# Patient Record
Sex: FEMALE | Race: OTHER | HISPANIC OR LATINO | URBAN - METROPOLITAN AREA
[De-identification: names, ages, dates, MRNs, and addresses within clinical notes are randomized per-mention and may not be internally consistent; named-entity substitution may affect disease eponyms.]

---

## 2021-09-05 ENCOUNTER — EMERGENCY (EMERGENCY)
Facility: HOSPITAL | Age: 21
LOS: 1 days | Discharge: HOME | End: 2021-09-05
Attending: EMERGENCY MEDICINE
Payer: MEDICAID

## 2021-09-05 VITALS
RESPIRATION RATE: 20 BRPM | OXYGEN SATURATION: 97 % | HEART RATE: 106 BPM | HEIGHT: 64 IN | SYSTOLIC BLOOD PRESSURE: 113 MMHG | TEMPERATURE: 98 F | WEIGHT: 149.91 LBS | DIASTOLIC BLOOD PRESSURE: 80 MMHG

## 2021-09-05 PROCEDURE — 99283 EMERGENCY DEPT VISIT LOW MDM: CPT

## 2021-09-05 RX ORDER — LORATADINE 10 MG/1
1 TABLET ORAL
Qty: 7 | Refills: 0
Start: 2021-09-05 | End: 2021-09-11

## 2021-09-05 RX ORDER — ALBUTEROL 90 UG/1
2 AEROSOL, METERED ORAL
Qty: 1 | Refills: 0
Start: 2021-09-05

## 2021-09-05 NOTE — ED PROVIDER NOTE - NSFOLLOWUPINSTRUCTIONS_ED_ALL_ED_FT
Please follow up with your primary care doctor in 1-2 days.  Please use the loratadine as needed for itchy throat.  Please use the albuterol for your cough.  Please return to the emergency department if you have shortness of breath, feeling like your throat is closing, vomiting, fever, or any other symptoms.      Cough, Adult    A cough helps to clear your throat and lungs. A cough may be a sign of an illness or another medical condition.    An acute cough may only last 2–3 weeks, while a chronic cough may last 8 or more weeks.  Many things can cause a cough. They include:  •Germs (viruses or bacteria) that attack the airway.  •Breathing in things that bother (irritate) your lungs.  •Allergies.  •Asthma.  •Mucus that runs down the back of your throat (postnasal drip).  •Smoking.  •Acid backing up from the stomach into the tube that moves food from the mouth to the stomach (gastroesophageal reflux).  •Some medicines.  •Lung problems.  •Other medical conditions, such as heart failure or a blood clot in the lung (pulmonary embolism).    Follow these instructions at home:    Medicines   •Take over-the-counter and prescription medicines only as told by your doctor.  •Talk with your doctor before you take medicines that stop a cough (cough suppressants).    Lifestyle   • Do not smoke, and try not to be around smoke. Do not use any products that contain nicotine or tobacco, such as cigarettes, e-cigarettes, and chewing tobacco. If you need help quitting, ask your doctor.  •Drink enough fluid to keep your pee (urine) pale yellow.  •Avoid caffeine.  • Do not drink alcohol if your doctor tells you not to drink.    General instructions    •Watch for any changes in your cough. Tell your doctor about them.  •Always cover your mouth when you cough.  •Stay away from things that make you cough, such as perfume, candles, campfire smoke, or cleaning products.  •If the air is dry, use a cool mist vaporizer or humidifier in your home.  •If your cough is worse at night, try using extra pillows to raise your head up higher while you sleep.  •Rest as needed.  •Keep all follow-up visits as told by your doctor. This is important.    Contact a doctor if:  •You have new symptoms.  •You cough up pus.  •Your cough does not get better after 2–3 weeks, or your cough gets worse.  •Cough medicine does not help your cough and you are not sleeping well.  •You have pain that gets worse or pain that is not helped with medicine.  •You have a fever.  •You are losing weight and you do not know why.  •You have night sweats.    Get help right away if:  •You cough up blood.  •You have trouble breathing.  •Your heartbeat is very fast.    These symptoms may be an emergency. Do not wait to see if the symptoms will go away. Get medical help right away. Call your local emergency services (911 in the [place] Do not drive yourself to the hospital.     Summary  •A cough helps to clear your throat and lungs. Many things can cause a cough.  •Take over-the-counter and prescription medicines only as told by your doctor.  •Always cover your mouth when you cough.  •Contact a doctor if you have new symptoms or you have a cough that does not get better or gets worse.    This information is not intended to replace advice given to you by your health care provider. Make sure you discuss any questions you have with your health care provider.

## 2021-09-05 NOTE — ED PROVIDER NOTE - PHYSICAL EXAMINATION
Afebrile, hemodynamically stable, saturating well  NAD, well appearing, sitting comfortably in bed, no WOB, speaking full sentences  No stridor, hoarseness, or drooling  Head NCAT  EOMI grossly, anicteric  MMM, uvula midline, no oropharyngeal lesions or exudates  No JVD  RRR, nml S1/S2, no m/r/g  Lungs CTAB, no w/r/r  Abd soft, NT, ND, nml BS, no rebound or guarding  AAO, CN's 3-12 grossly intact  MARADIAGA spontaneously, no leg cyanosis or edema  Skin warm, well perfused, no rashes or hives

## 2021-09-05 NOTE — ED PROVIDER NOTE - CLINICAL SUMMARY MEDICAL DECISION MAKING FREE TEXT BOX
No e/o PNA or fluid overload on exam. No e/o RAD/asthma exacerbation. No e/o PTA or RPA. Character low suspicion for strep. Itchy throat and asthma hx, as well as recently been told she has allergies, does not take meds for this, c/w allergic etiology. No airway swelling. Patient is well appearing, NAD, afebrile, hemodynamically stable, alert, well hydrated. Discharged with rx albuterol, loratadine, instructions in further symptomatic care, return precautions, and need for PMD f/u.

## 2021-09-05 NOTE — ED PROVIDER NOTE - OBJECTIVE STATEMENT
21yoF with h/o asthma, presents with muco-productive cough x1 wk and itchy throat x 2 days, is neal bothered by the throat. Denies fever, CP, SOB, and all other symptoms. Neg COVID swab during these symptoms. Had asthma as a kid, has not needed tx for this and does not have meds for this at home.

## 2021-09-05 NOTE — ED PROVIDER NOTE - PATIENT PORTAL LINK FT
You can access the FollowMyHealth Patient Portal offered by [facility] by registering at the following website: [email or web address]. By joining [employer]’s FollowMyHealth portal, you will also be able to view your health information using other applications (apps) compatible with our system.

## 2023-09-20 ENCOUNTER — EMERGENCY (EMERGENCY)
Facility: HOSPITAL | Age: 23
LOS: 1 days | Discharge: HOME | End: 2023-09-20
Attending: EMERGENCY MEDICINE
Payer: MEDICAID

## 2023-09-20 VITALS
TEMPERATURE: 98 F | SYSTOLIC BLOOD PRESSURE: 110 MMHG | OXYGEN SATURATION: 99 % | HEART RATE: 78 BPM | RESPIRATION RATE: 18 BRPM | DIASTOLIC BLOOD PRESSURE: 76 MMHG

## 2023-09-20 VITALS
RESPIRATION RATE: 18 BRPM | HEIGHT: 64 IN | TEMPERATURE: 98 F | HEART RATE: 80 BPM | WEIGHT: 148.81 LBS | DIASTOLIC BLOOD PRESSURE: 73 MMHG | SYSTOLIC BLOOD PRESSURE: 109 MMHG | OXYGEN SATURATION: 99 %

## 2023-09-20 LAB
ALBUMIN SERPL ELPH-MCNC: 3.6 G/DL — SIGNIFICANT CHANGE UP (ref 3.5–5)
ALP SERPL-CCNC: 68 U/L — SIGNIFICANT CHANGE UP (ref 40–120)
ALT FLD-CCNC: 20 U/L DA — SIGNIFICANT CHANGE UP (ref 10–60)
ANION GAP SERPL CALC-SCNC: 8 MMOL/L — SIGNIFICANT CHANGE UP (ref 5–17)
APPEARANCE UR: CLEAR — SIGNIFICANT CHANGE UP
AST SERPL-CCNC: 11 U/L — SIGNIFICANT CHANGE UP (ref 10–40)
BACTERIA # UR AUTO: ABNORMAL /HPF
BASOPHILS # BLD AUTO: 0.01 K/UL — SIGNIFICANT CHANGE UP (ref 0–0.2)
BASOPHILS NFR BLD AUTO: 0.1 % — SIGNIFICANT CHANGE UP (ref 0–2)
BILIRUB SERPL-MCNC: 0.3 MG/DL — SIGNIFICANT CHANGE UP (ref 0.2–1.2)
BILIRUB UR-MCNC: NEGATIVE — SIGNIFICANT CHANGE UP
BUN SERPL-MCNC: 13 MG/DL — SIGNIFICANT CHANGE UP (ref 7–18)
CALCIUM SERPL-MCNC: 8.8 MG/DL — SIGNIFICANT CHANGE UP (ref 8.4–10.5)
CHLORIDE SERPL-SCNC: 104 MMOL/L — SIGNIFICANT CHANGE UP (ref 96–108)
CO2 SERPL-SCNC: 29 MMOL/L — SIGNIFICANT CHANGE UP (ref 22–31)
COLOR SPEC: YELLOW — SIGNIFICANT CHANGE UP
CREAT SERPL-MCNC: 0.72 MG/DL — SIGNIFICANT CHANGE UP (ref 0.5–1.3)
DIFF PNL FLD: NEGATIVE — SIGNIFICANT CHANGE UP
EGFR: 121 ML/MIN/1.73M2 — SIGNIFICANT CHANGE UP
EOSINOPHIL # BLD AUTO: 0 K/UL — SIGNIFICANT CHANGE UP (ref 0–0.5)
EOSINOPHIL NFR BLD AUTO: 0 % — SIGNIFICANT CHANGE UP (ref 0–6)
EPI CELLS # UR: PRESENT
GLUCOSE SERPL-MCNC: 93 MG/DL — SIGNIFICANT CHANGE UP (ref 70–99)
GLUCOSE UR QL: NEGATIVE MG/DL — SIGNIFICANT CHANGE UP
HCG SERPL-ACNC: <1 MIU/ML — SIGNIFICANT CHANGE UP
HCT VFR BLD CALC: 36.1 % — SIGNIFICANT CHANGE UP (ref 34.5–45)
HGB BLD-MCNC: 11.6 G/DL — SIGNIFICANT CHANGE UP (ref 11.5–15.5)
IMM GRANULOCYTES NFR BLD AUTO: 0.1 % — SIGNIFICANT CHANGE UP (ref 0–0.9)
KETONES UR-MCNC: NEGATIVE MG/DL — SIGNIFICANT CHANGE UP
LEUKOCYTE ESTERASE UR-ACNC: ABNORMAL
LYMPHOCYTES # BLD AUTO: 2.22 K/UL — SIGNIFICANT CHANGE UP (ref 1–3.3)
LYMPHOCYTES # BLD AUTO: 32.8 % — SIGNIFICANT CHANGE UP (ref 13–44)
MCHC RBC-ENTMCNC: 29.7 PG — SIGNIFICANT CHANGE UP (ref 27–34)
MCHC RBC-ENTMCNC: 32.1 GM/DL — SIGNIFICANT CHANGE UP (ref 32–36)
MCV RBC AUTO: 92.6 FL — SIGNIFICANT CHANGE UP (ref 80–100)
MONOCYTES # BLD AUTO: 0.76 K/UL — SIGNIFICANT CHANGE UP (ref 0–0.9)
MONOCYTES NFR BLD AUTO: 11.2 % — SIGNIFICANT CHANGE UP (ref 2–14)
NEUTROPHILS # BLD AUTO: 3.76 K/UL — SIGNIFICANT CHANGE UP (ref 1.8–7.4)
NEUTROPHILS NFR BLD AUTO: 55.8 % — SIGNIFICANT CHANGE UP (ref 43–77)
NITRITE UR-MCNC: NEGATIVE — SIGNIFICANT CHANGE UP
NRBC # BLD: 0 /100 WBCS — SIGNIFICANT CHANGE UP (ref 0–0)
PH UR: 7 — SIGNIFICANT CHANGE UP (ref 5–8)
PLATELET # BLD AUTO: 275 K/UL — SIGNIFICANT CHANGE UP (ref 150–400)
POTASSIUM SERPL-MCNC: 4.2 MMOL/L — SIGNIFICANT CHANGE UP (ref 3.5–5.3)
POTASSIUM SERPL-SCNC: 4.2 MMOL/L — SIGNIFICANT CHANGE UP (ref 3.5–5.3)
PROT SERPL-MCNC: 7.6 G/DL — SIGNIFICANT CHANGE UP (ref 6–8.3)
PROT UR-MCNC: NEGATIVE MG/DL — SIGNIFICANT CHANGE UP
RBC # BLD: 3.9 M/UL — SIGNIFICANT CHANGE UP (ref 3.8–5.2)
RBC # FLD: 15 % — HIGH (ref 10.3–14.5)
RBC CASTS # UR COMP ASSIST: 2 /HPF — SIGNIFICANT CHANGE UP (ref 0–4)
SODIUM SERPL-SCNC: 141 MMOL/L — SIGNIFICANT CHANGE UP (ref 135–145)
SP GR SPEC: 1.01 — SIGNIFICANT CHANGE UP (ref 1–1.03)
UROBILINOGEN FLD QL: 0.2 MG/DL — SIGNIFICANT CHANGE UP (ref 0.2–1)
WBC # BLD: 6.76 K/UL — SIGNIFICANT CHANGE UP (ref 3.8–10.5)
WBC # FLD AUTO: 6.76 K/UL — SIGNIFICANT CHANGE UP (ref 3.8–10.5)
WBC UR QL: 2 /HPF — SIGNIFICANT CHANGE UP (ref 0–5)

## 2023-09-20 PROCEDURE — 84702 CHORIONIC GONADOTROPIN TEST: CPT

## 2023-09-20 PROCEDURE — 36415 COLL VENOUS BLD VENIPUNCTURE: CPT

## 2023-09-20 PROCEDURE — 99284 EMERGENCY DEPT VISIT MOD MDM: CPT | Mod: 25

## 2023-09-20 PROCEDURE — 99284 EMERGENCY DEPT VISIT MOD MDM: CPT

## 2023-09-20 PROCEDURE — 80053 COMPREHEN METABOLIC PANEL: CPT

## 2023-09-20 PROCEDURE — 76830 TRANSVAGINAL US NON-OB: CPT

## 2023-09-20 PROCEDURE — 76856 US EXAM PELVIC COMPLETE: CPT | Mod: 26

## 2023-09-20 PROCEDURE — 81001 URINALYSIS AUTO W/SCOPE: CPT

## 2023-09-20 PROCEDURE — 76830 TRANSVAGINAL US NON-OB: CPT | Mod: 26

## 2023-09-20 PROCEDURE — 76856 US EXAM PELVIC COMPLETE: CPT

## 2023-09-20 PROCEDURE — 85025 COMPLETE CBC W/AUTO DIFF WBC: CPT

## 2023-09-20 RX ORDER — KETOROLAC TROMETHAMINE 30 MG/ML
15 SYRINGE (ML) INJECTION ONCE
Refills: 0 | Status: COMPLETED | OUTPATIENT
Start: 2023-09-20 | End: 2023-09-20

## 2023-09-20 NOTE — ED PROVIDER NOTE - CLINICAL SUMMARY MEDICAL DECISION MAKING FREE TEXT BOX
Patient presenting complaining of pelvic pain with missed menses - plan for labs, HCG level to evaluate for early pregnancy, TVUS, pain control and re-evaluate.

## 2023-09-20 NOTE — ED PROVIDER NOTE - PROGRESS NOTE DETAILS
Patient reporting significant relief of symptoms with toradol.  Labs unremarkable.  Pending US result.

## 2023-09-20 NOTE — ED PROVIDER NOTE - PHYSICAL EXAMINATION
Exam:  General: Patient well appearing, vital signs within normal limits  HEENT: airway patent with moist mucous membranes  Cardiac: RRR S1/S2 with strong peripheral pulses  Respiratory: lungs clear without respiratory distress  GI: abdomen soft, mild diffuse lower abdominal/pelvic tenderness  Neuro: no gross neurologic deficits  Skin: warm, well perfused  Psych: normal mood and affect

## 2023-09-20 NOTE — ED PROVIDER NOTE - NSFOLLOWUPINSTRUCTIONS_ED_ALL_ED_FT
Thank you for choosing [facility] for your healthcare.    You were seen in the Emergency Department for pelvic pain.  Here you had blood testing did not show any signs of a significant infection and no signs of a urinary infection on your urine testing.  You had a pelvic ultrasound that shows a cyst on your right ovary that could be the cause of your pain.  You are not pregnant based off of the blood testing at this time.  You received an IV equivalent of ibuprofen in the emergency room for the your pain is significantly improved.  If your pain reoccurs you can try taking 2-3 regular ibuprofen tablets at home every 6 hours as needed.  Please return to the emergency room for uncontrollable nausea or vomiting, high fevers, severe uncontrollable pain despite home medications or for any other concerning or emergent medical issues.

## 2023-09-20 NOTE — ED PROVIDER NOTE - PATIENT PORTAL LINK FT
You can access the FollowMyHealth Patient Portal offered by [facility] by registering at the following website: [email or web address]. By joining [employer]’s FollowMyHealth portal, you will also be able to view your health information using other applications (apps) compatible with our system.

## 2023-09-20 NOTE — ED PROVIDER NOTE - OBJECTIVE STATEMENT
23-year-old woman with a past medical history of prior cholecystectomy presenting with bilateral pelvic pain beginning earlier today.  Reports has missed her period for the last 5 days but took negative home pregnancy test.  Denying any nauseousness or vomiting.  Does endorse some mild diarrhea.  No reported sick contacts or fevers.

## 2023-09-20 NOTE — ED ADULT NURSE NOTE - OBJECTIVE STATEMENT
C/O LOWER ABDOMINAL PAIN X 5 DAYS. + DIARRHEA AND NAUSEA. PAIN SCALE 5/10 MEDICATION GIVEN AS ORDERED. LABS. DONE

## 2024-07-27 ENCOUNTER — EMERGENCY (EMERGENCY)
Facility: HOSPITAL | Age: 24
LOS: 1 days | Discharge: HOME | End: 2024-07-27
Attending: EMERGENCY MEDICINE
Payer: MEDICAID

## 2024-07-27 VITALS
RESPIRATION RATE: 18 BRPM | HEART RATE: 89 BPM | OXYGEN SATURATION: 99 % | TEMPERATURE: 98 F | DIASTOLIC BLOOD PRESSURE: 70 MMHG | SYSTOLIC BLOOD PRESSURE: 116 MMHG | WEIGHT: 140.43 LBS | HEIGHT: 64 IN

## 2024-07-27 VITALS
DIASTOLIC BLOOD PRESSURE: 67 MMHG | OXYGEN SATURATION: 99 % | SYSTOLIC BLOOD PRESSURE: 104 MMHG | HEART RATE: 70 BPM | RESPIRATION RATE: 18 BRPM | TEMPERATURE: 99 F

## 2024-07-27 LAB
ALBUMIN SERPL ELPH-MCNC: 3.3 G/DL — LOW (ref 3.5–5)
ALP SERPL-CCNC: 59 U/L — SIGNIFICANT CHANGE UP (ref 40–120)
ALT FLD-CCNC: 24 U/L DA — SIGNIFICANT CHANGE UP (ref 10–60)
ANION GAP SERPL CALC-SCNC: 5 MMOL/L — SIGNIFICANT CHANGE UP (ref 5–17)
AST SERPL-CCNC: 13 U/L — SIGNIFICANT CHANGE UP (ref 10–40)
BASOPHILS # BLD AUTO: 0.03 K/UL — SIGNIFICANT CHANGE UP (ref 0–0.2)
BASOPHILS NFR BLD AUTO: 0.3 % — SIGNIFICANT CHANGE UP (ref 0–2)
BILIRUB SERPL-MCNC: 0.3 MG/DL — SIGNIFICANT CHANGE UP (ref 0.2–1.2)
BLD GP AB SCN SERPL QL: SIGNIFICANT CHANGE UP
BUN SERPL-MCNC: 9 MG/DL — SIGNIFICANT CHANGE UP (ref 7–18)
CALCIUM SERPL-MCNC: 9 MG/DL — SIGNIFICANT CHANGE UP (ref 8.4–10.5)
CHLORIDE SERPL-SCNC: 107 MMOL/L — SIGNIFICANT CHANGE UP (ref 96–108)
CO2 SERPL-SCNC: 23 MMOL/L — SIGNIFICANT CHANGE UP (ref 22–31)
CREAT SERPL-MCNC: 0.64 MG/DL — SIGNIFICANT CHANGE UP (ref 0.5–1.3)
EGFR: 127 ML/MIN/1.73M2 — SIGNIFICANT CHANGE UP
EOSINOPHIL # BLD AUTO: 0.01 K/UL — SIGNIFICANT CHANGE UP (ref 0–0.5)
EOSINOPHIL NFR BLD AUTO: 0.1 % — SIGNIFICANT CHANGE UP (ref 0–6)
GLUCOSE SERPL-MCNC: 90 MG/DL — SIGNIFICANT CHANGE UP (ref 70–99)
HCG SERPL-ACNC: HIGH MIU/ML
HCT VFR BLD CALC: 33.3 % — LOW (ref 34.5–45)
HGB BLD-MCNC: 11.2 G/DL — LOW (ref 11.5–15.5)
IMM GRANULOCYTES NFR BLD AUTO: 0.3 % — SIGNIFICANT CHANGE UP (ref 0–0.9)
LYMPHOCYTES # BLD AUTO: 1.76 K/UL — SIGNIFICANT CHANGE UP (ref 1–3.3)
LYMPHOCYTES # BLD AUTO: 20.1 % — SIGNIFICANT CHANGE UP (ref 13–44)
MAGNESIUM SERPL-MCNC: 1.9 MG/DL — SIGNIFICANT CHANGE UP (ref 1.6–2.6)
MCHC RBC-ENTMCNC: 30.7 PG — SIGNIFICANT CHANGE UP (ref 27–34)
MCHC RBC-ENTMCNC: 33.6 GM/DL — SIGNIFICANT CHANGE UP (ref 32–36)
MCV RBC AUTO: 91.2 FL — SIGNIFICANT CHANGE UP (ref 80–100)
MONOCYTES # BLD AUTO: 0.8 K/UL — SIGNIFICANT CHANGE UP (ref 0–0.9)
MONOCYTES NFR BLD AUTO: 9.1 % — SIGNIFICANT CHANGE UP (ref 2–14)
NEUTROPHILS # BLD AUTO: 6.14 K/UL — SIGNIFICANT CHANGE UP (ref 1.8–7.4)
NEUTROPHILS NFR BLD AUTO: 70.1 % — SIGNIFICANT CHANGE UP (ref 43–77)
NRBC # BLD: 0 /100 WBCS — SIGNIFICANT CHANGE UP (ref 0–0)
PLATELET # BLD AUTO: 269 K/UL — SIGNIFICANT CHANGE UP (ref 150–400)
POTASSIUM SERPL-MCNC: 4.1 MMOL/L — SIGNIFICANT CHANGE UP (ref 3.5–5.3)
POTASSIUM SERPL-SCNC: 4.1 MMOL/L — SIGNIFICANT CHANGE UP (ref 3.5–5.3)
PROT SERPL-MCNC: 7.2 G/DL — SIGNIFICANT CHANGE UP (ref 6–8.3)
RBC # BLD: 3.65 M/UL — LOW (ref 3.8–5.2)
RBC # FLD: 13.2 % — SIGNIFICANT CHANGE UP (ref 10.3–14.5)
SODIUM SERPL-SCNC: 135 MMOL/L — SIGNIFICANT CHANGE UP (ref 135–145)
WBC # BLD: 8.77 K/UL — SIGNIFICANT CHANGE UP (ref 3.8–10.5)
WBC # FLD AUTO: 8.77 K/UL — SIGNIFICANT CHANGE UP (ref 3.8–10.5)

## 2024-07-27 PROCEDURE — 76801 OB US < 14 WKS SINGLE FETUS: CPT

## 2024-07-27 PROCEDURE — 85025 COMPLETE CBC W/AUTO DIFF WBC: CPT

## 2024-07-27 PROCEDURE — 99284 EMERGENCY DEPT VISIT MOD MDM: CPT

## 2024-07-27 PROCEDURE — 86901 BLOOD TYPING SEROLOGIC RH(D): CPT

## 2024-07-27 PROCEDURE — 76817 TRANSVAGINAL US OBSTETRIC: CPT

## 2024-07-27 PROCEDURE — 80053 COMPREHEN METABOLIC PANEL: CPT

## 2024-07-27 PROCEDURE — 84702 CHORIONIC GONADOTROPIN TEST: CPT

## 2024-07-27 PROCEDURE — 76817 TRANSVAGINAL US OBSTETRIC: CPT | Mod: 26

## 2024-07-27 PROCEDURE — 99284 EMERGENCY DEPT VISIT MOD MDM: CPT | Mod: 25

## 2024-07-27 PROCEDURE — 86850 RBC ANTIBODY SCREEN: CPT

## 2024-07-27 PROCEDURE — 83735 ASSAY OF MAGNESIUM: CPT

## 2024-07-27 PROCEDURE — 36415 COLL VENOUS BLD VENIPUNCTURE: CPT

## 2024-07-27 PROCEDURE — 76801 OB US < 14 WKS SINGLE FETUS: CPT | Mod: 26

## 2024-07-27 PROCEDURE — 86900 BLOOD TYPING SEROLOGIC ABO: CPT

## 2024-07-27 NOTE — ED PROVIDER NOTE - CLINICAL SUMMARY MEDICAL DECISION MAKING FREE TEXT BOX
24 years old  1 para 0 complaining of vaginal bleeding since last night, concern for threatened .  Will get labs, beta-hCG, sono, type and screen and reassess

## 2024-07-27 NOTE — ED ADULT TRIAGE NOTE - CHIEF COMPLAINT QUOTE
Patient states " I am 8 weeks pregnant and I am spotting since last night." Patient reports this is first pregnancy, LMP  5/23/ 24 and due date 03/07/25.

## 2024-07-27 NOTE — ED PROVIDER NOTE - NSFOLLOWUPINSTRUCTIONS_ED_ALL_ED_FT
Threatened Miscarriage  A threatened miscarriage occurs when a woman has vaginal bleeding during the first 20 weeks of pregnancy but the pregnancy has not ended. If vaginal bleeding occurs during this time, the health care provider will do tests to make sure the woman is still pregnant. The woman's condition may be considered a threatened miscarriage if the tests show:  That she is still pregnant.  That the embryo or unborn baby (fetus) inside the uterus is still growing.  A threatened miscarriage does not mean your pregnancy will end, but it does increase the risk of losing your pregnancy (miscarriage).    What are the causes?  The cause of this condition is usually not known.    What increases the risk?  The following factors may make a pregnant woman more likely to have a miscarriage:    Certain medical conditions    Conditions that affect the hormone balance in the body, such as thyroid disease or polycystic ovary syndrome.  Diabetes.  Autoimmune disorders.  Infections.  Bleeding disorders.  Obesity.  Lifestyle factors    Using products with tobacco or nicotine or being exposed to tobacco smoke.  Having alcohol.  Having large amounts of caffeine.  Recreational drug use.  Problems with reproductive organs or structures    Cervical insufficiency. This is when the the lowest part of the uterus (cervix) opens and thins before pregnancy is at term.  Having a condition called Asherman syndrome, which causes scarring in the uterus or causes the uterus to be abnormal in structure.  Fibrous growths, called fibroids, in the uterus.  Congenital abnormalities. These problems are present at birth.  Infection of the cervix or uterus.  Personal or medical history    Injury (trauma).  Having had a miscarriage before.  Being younger than age 18 or older than age 35.  Exposure to harmful substances in the environment. This may include radiation or heavy metals, such as lead.  Using certain medicines.  What are the signs or symptoms?  Symptoms of this condition include:  Vaginal bleeding or spotting, with or without cramps or pain.  Mild pain or cramps in your abdomen.  How is this diagnosed?    You may have tests to check whether you are still pregnant. These tests will be done if you have bleeding, with or without pain, in your abdomen before the 20th week of pregnancy. These tests include:  Ultrasound.  A physical exam.  Measurement of your baby's heart rate.  Lab tests, such as blood tests, urine tests, or swabs for infection.  You may be diagnosed with a threatened miscarriage if:  Ultrasound testing shows that you are still pregnant.  Your baby's heart rate is strong.  A physical exam shows that your cervix is closed.  Blood tests confirm that you are still pregnant.  How is this treated?  No treatments have been shown to prevent a threatened miscarriage from going on to a complete miscarriage. However, the right home care is important.    Follow these instructions at home:  Get plenty of rest.  Do not have sex, douche, or put anything in your vagina, such as tampons, until your health care provider says it is okay.  Do not smoke or use recreational drugs.  Do not drink alcohol.  Avoid caffeine.  Keep all follow-up prenatal visits. This is important.  Contact a health care provider if:  You have light vaginal bleeding or spotting while pregnant.  You have pain or cramping in your abdomen.  You have a fever.  Get help right away if:  Heavy bleeding soaks through 2 large sanitary pads an hour for more than 2 hours.  Blood clots come out of your vagina.  Tissue comes out of your vagina.  You leak fluid, or you have a gush of fluid from your vagina.  You have severe low back pain or cramps in your abdomen.  You have a fever, chills, and severe pain in the abdomen.  Summary  A threatened miscarriage occurs when a woman bleeds from the vagina during the first 20 weeks of pregnancy but the pregnancy has not ended.  The cause of a threatened miscarriage is usually not known.  Symptoms of this condition may include vaginal bleeding and mild pain or cramps in your abdomen.  No treatments have been shown to prevent a threatened miscarriage from going on to a complete miscarriage.  Keep all follow-up prenatal visits. This is important.  This information is not intended to replace advice given to you by your health care provider. Make sure you discuss any questions you have with your health care provider.      No douching, intercourse until further instruction   drink plenty of fluids   follow-up with your obstetrician/gynecologist

## 2024-07-27 NOTE — ED PROVIDER NOTE - PATIENT PORTAL LINK FT
You can access the FollowMyHealth Patient Portal offered by [facility] by registering at the following website: [email or web address]. By joining [employer]’s FollowMyHealth portal, you will also be able to view your health information using other applications (apps) compatible with our system.

## 2024-07-27 NOTE — ED PROVIDER NOTE - PROGRESS NOTE DETAILS
labs/sono result explained to patient   patient with mild anemia, IUP 6 weeks 6 days.  Patient with no further vaginal bleeding, will DC home, advised to follow-up with GYN

## 2024-07-27 NOTE — ED ADULT NURSE NOTE - OBJECTIVE STATEMENT
pt is here for vaginal bleeding.  pt stated that 8 weeks pregnant, last night heavy bleeding, today, it was spotting, denied dizziness or headahce,

## 2024-07-27 NOTE — ED ADULT NURSE NOTE - NSFALLUNIVINTERV_ED_ALL_ED
Bed/Stretcher in lowest position, wheels locked, appropriate side rails in place/Call bell, personal items and telephone in reach/Instruct patient to call for assistance before getting out of bed/chair/stretcher/Non-slip footwear applied when patient is off stretcher/[place] to call system/Physically safe environment - no spills, clutter or unnecessary equipment/Purposeful proactive rounding/Room/bathroom lighting operational, light cord in reach

## 2024-07-27 NOTE — ED PROVIDER NOTE - CPE EDP [facility] NORM
Quality 226: Preventive Care And Screening: Tobacco Use: Screening And Cessation Intervention: Patient screened for tobacco use and is an ex/non-smoker Detail Level: Generalized Quality 431: Preventive Care And Screening: Unhealthy Alcohol Use - Screening: Patient not identified as an unhealthy alcohol user when screened for unhealthy alcohol use using a systematic screening method Quality 130: Documentation Of Current Medications In The Medical Record: Current Medications Documented normal...

## 2024-07-27 NOTE — ED PROVIDER NOTE - OBJECTIVE STATEMENT
24-year-old female with no PMH, , LMP .  Patient complaining of vaginal bleeding since last night, changed to pantiliners.  She denies intercourse, abdominal pain, dysuria, fever. patient claims today with minimum pinkish discharge.  She vomited x 1 this morning

## 2025-02-12 ENCOUNTER — OUTPATIENT (OUTPATIENT)
Dept: OUTPATIENT SERVICES | Facility: HOSPITAL | Age: 25
LOS: 1 days | End: 2025-02-12
Payer: COMMERCIAL

## 2025-02-12 VITALS — TEMPERATURE: 98 F | OXYGEN SATURATION: 88 %

## 2025-02-12 DIAGNOSIS — O26.899 OTHER SPECIFIED PREGNANCY RELATED CONDITIONS, UNSPECIFIED TRIMESTER: ICD-10-CM

## 2025-02-12 LAB
ALBUMIN SERPL ELPH-MCNC: 3.3 G/DL — SIGNIFICANT CHANGE UP (ref 3.3–5)
ALP SERPL-CCNC: 231 U/L — HIGH (ref 40–120)
ALT FLD-CCNC: 36 U/L — SIGNIFICANT CHANGE UP (ref 10–45)
AMYLASE P1 CFR SERPL: 68 U/L — SIGNIFICANT CHANGE UP (ref 25–125)
ANION GAP SERPL CALC-SCNC: 12 MMOL/L — SIGNIFICANT CHANGE UP (ref 5–17)
APPEARANCE UR: CLEAR — SIGNIFICANT CHANGE UP
APTT BLD: 27.6 SEC — SIGNIFICANT CHANGE UP (ref 24.5–35.6)
AST SERPL-CCNC: 39 U/L — SIGNIFICANT CHANGE UP (ref 10–40)
BACTERIA # UR AUTO: ABNORMAL /HPF
BASOPHILS # BLD AUTO: 0.01 K/UL — SIGNIFICANT CHANGE UP (ref 0–0.2)
BASOPHILS NFR BLD AUTO: 0.2 % — SIGNIFICANT CHANGE UP (ref 0–2)
BILIRUB SERPL-MCNC: 0.5 MG/DL — SIGNIFICANT CHANGE UP (ref 0.2–1.2)
BILIRUB UR-MCNC: NEGATIVE — SIGNIFICANT CHANGE UP
BUN SERPL-MCNC: 11 MG/DL — SIGNIFICANT CHANGE UP (ref 7–23)
CALCIUM SERPL-MCNC: 9.5 MG/DL — SIGNIFICANT CHANGE UP (ref 8.4–10.5)
CAST: 0 /LPF — SIGNIFICANT CHANGE UP (ref 0–4)
CHLORIDE SERPL-SCNC: 105 MMOL/L — SIGNIFICANT CHANGE UP (ref 96–108)
CO2 SERPL-SCNC: 19 MMOL/L — LOW (ref 22–31)
COLOR SPEC: YELLOW — SIGNIFICANT CHANGE UP
CREAT ?TM UR-MCNC: 38 MG/DL — SIGNIFICANT CHANGE UP
CREAT SERPL-MCNC: 0.61 MG/DL — SIGNIFICANT CHANGE UP (ref 0.5–1.3)
DIFF PNL FLD: NEGATIVE — SIGNIFICANT CHANGE UP
EGFR: 129 ML/MIN/1.73M2 — SIGNIFICANT CHANGE UP
EOSINOPHIL # BLD AUTO: 0.03 K/UL — SIGNIFICANT CHANGE UP (ref 0–0.5)
EOSINOPHIL NFR BLD AUTO: 0.5 % — SIGNIFICANT CHANGE UP (ref 0–6)
FIBRINOGEN PPP-MCNC: 531 MG/DL — HIGH (ref 200–445)
GLUCOSE SERPL-MCNC: 89 MG/DL — SIGNIFICANT CHANGE UP (ref 70–99)
GLUCOSE UR QL: NEGATIVE MG/DL — SIGNIFICANT CHANGE UP
HCT VFR BLD CALC: 33.3 % — LOW (ref 34.5–45)
HGB BLD-MCNC: 11 G/DL — LOW (ref 11.5–15.5)
IMM GRANULOCYTES NFR BLD AUTO: 0.4 % — SIGNIFICANT CHANGE UP (ref 0–0.9)
INR BLD: 0.83 RATIO — LOW (ref 0.85–1.16)
KETONES UR-MCNC: NEGATIVE MG/DL — SIGNIFICANT CHANGE UP
LDH SERPL L TO P-CCNC: 188 U/L — SIGNIFICANT CHANGE UP (ref 50–242)
LEUKOCYTE ESTERASE UR-ACNC: ABNORMAL
LIDOCAIN IGE QN: 40 U/L — SIGNIFICANT CHANGE UP (ref 7–60)
LYMPHOCYTES # BLD AUTO: 1.39 K/UL — SIGNIFICANT CHANGE UP (ref 1–3.3)
LYMPHOCYTES # BLD AUTO: 24.6 % — SIGNIFICANT CHANGE UP (ref 13–44)
MCHC RBC-ENTMCNC: 31.2 PG — SIGNIFICANT CHANGE UP (ref 27–34)
MCHC RBC-ENTMCNC: 33 G/DL — SIGNIFICANT CHANGE UP (ref 32–36)
MCV RBC AUTO: 94.3 FL — SIGNIFICANT CHANGE UP (ref 80–100)
MONOCYTES # BLD AUTO: 0.33 K/UL — SIGNIFICANT CHANGE UP (ref 0–0.9)
MONOCYTES NFR BLD AUTO: 5.8 % — SIGNIFICANT CHANGE UP (ref 2–14)
NEUTROPHILS # BLD AUTO: 3.88 K/UL — SIGNIFICANT CHANGE UP (ref 1.8–7.4)
NEUTROPHILS NFR BLD AUTO: 68.5 % — SIGNIFICANT CHANGE UP (ref 43–77)
NITRITE UR-MCNC: NEGATIVE — SIGNIFICANT CHANGE UP
NRBC BLD AUTO-RTO: 0 /100 WBCS — SIGNIFICANT CHANGE UP (ref 0–0)
PH UR: 7 — SIGNIFICANT CHANGE UP (ref 5–8)
PLATELET # BLD AUTO: 188 K/UL — SIGNIFICANT CHANGE UP (ref 150–400)
POTASSIUM SERPL-MCNC: 3.9 MMOL/L — SIGNIFICANT CHANGE UP (ref 3.5–5.3)
POTASSIUM SERPL-SCNC: 3.9 MMOL/L — SIGNIFICANT CHANGE UP (ref 3.5–5.3)
PROT ?TM UR-MCNC: <7 MG/DL — SIGNIFICANT CHANGE UP (ref 0–12)
PROT SERPL-MCNC: 6.4 G/DL — SIGNIFICANT CHANGE UP (ref 6–8.3)
PROT UR-MCNC: NEGATIVE MG/DL — SIGNIFICANT CHANGE UP
PROT/CREAT UR-RTO: <0.2 RATIO — SIGNIFICANT CHANGE UP (ref 0–0.2)
PROTHROM AB SERPL-ACNC: 9.5 SEC — LOW (ref 9.9–13.4)
RBC # BLD: 3.53 M/UL — LOW (ref 3.8–5.2)
RBC # FLD: 13.4 % — SIGNIFICANT CHANGE UP (ref 10.3–14.5)
RBC CASTS # UR COMP ASSIST: 0 /HPF — SIGNIFICANT CHANGE UP (ref 0–4)
SODIUM SERPL-SCNC: 136 MMOL/L — SIGNIFICANT CHANGE UP (ref 135–145)
SP GR SPEC: 1.01 — SIGNIFICANT CHANGE UP (ref 1–1.03)
SQUAMOUS # UR AUTO: 13 /HPF — HIGH (ref 0–5)
URATE SERPL-MCNC: 5.5 MG/DL — SIGNIFICANT CHANGE UP (ref 2.5–7)
UROBILINOGEN FLD QL: 0.2 MG/DL — SIGNIFICANT CHANGE UP (ref 0.2–1)
WBC # BLD: 5.66 K/UL — SIGNIFICANT CHANGE UP (ref 3.8–10.5)
WBC # FLD AUTO: 5.66 K/UL — SIGNIFICANT CHANGE UP (ref 3.8–10.5)
WBC UR QL: 13 /HPF — HIGH (ref 0–5)

## 2025-02-12 PROCEDURE — 96360 HYDRATION IV INFUSION INIT: CPT

## 2025-02-12 PROCEDURE — 85610 PROTHROMBIN TIME: CPT

## 2025-02-12 PROCEDURE — 80053 COMPREHEN METABOLIC PANEL: CPT

## 2025-02-12 PROCEDURE — 82150 ASSAY OF AMYLASE: CPT

## 2025-02-12 PROCEDURE — 87186 SC STD MICRODIL/AGAR DIL: CPT

## 2025-02-12 PROCEDURE — G0463: CPT

## 2025-02-12 PROCEDURE — 83615 LACTATE (LD) (LDH) ENZYME: CPT

## 2025-02-12 PROCEDURE — 83690 ASSAY OF LIPASE: CPT

## 2025-02-12 PROCEDURE — 84156 ASSAY OF PROTEIN URINE: CPT

## 2025-02-12 PROCEDURE — 85730 THROMBOPLASTIN TIME PARTIAL: CPT

## 2025-02-12 PROCEDURE — 82239 BILE ACIDS TOTAL: CPT

## 2025-02-12 PROCEDURE — 96361 HYDRATE IV INFUSION ADD-ON: CPT

## 2025-02-12 PROCEDURE — 84550 ASSAY OF BLOOD/URIC ACID: CPT

## 2025-02-12 PROCEDURE — 85384 FIBRINOGEN ACTIVITY: CPT

## 2025-02-12 PROCEDURE — 85025 COMPLETE CBC W/AUTO DIFF WBC: CPT

## 2025-02-12 PROCEDURE — 82570 ASSAY OF URINE CREATININE: CPT

## 2025-02-12 PROCEDURE — 81001 URINALYSIS AUTO W/SCOPE: CPT

## 2025-02-12 PROCEDURE — 87086 URINE CULTURE/COLONY COUNT: CPT

## 2025-02-12 RX ORDER — IBUPROFEN 200 MG
1 CAPSULE ORAL ONCE
Refills: 0 | Status: COMPLETED | OUTPATIENT
Start: 2025-02-12 | End: 2025-02-12

## 2025-02-12 RX ORDER — SODIUM CHLORIDE 9 G/ML
500 INJECTION, SOLUTION INTRAVENOUS ONCE
Refills: 0 | Status: COMPLETED | OUTPATIENT
Start: 2025-02-12 | End: 2025-02-12

## 2025-02-12 RX ORDER — SODIUM CHLORIDE 9 G/ML
1000 INJECTION, SOLUTION INTRAVENOUS
Refills: 0 | Status: ACTIVE | OUTPATIENT
Start: 2025-02-12 | End: 2026-01-11

## 2025-02-12 RX ADMIN — Medication 1 TABLET(S): at 22:42

## 2025-02-12 RX ADMIN — SODIUM CHLORIDE 125 MILLILITER(S): 9 INJECTION, SOLUTION INTRAVENOUS at 22:10

## 2025-02-12 RX ADMIN — SODIUM CHLORIDE 1000 MILLILITER(S): 9 INJECTION, SOLUTION INTRAVENOUS at 21:41

## 2025-02-12 NOTE — OB PROVIDER TRIAGE NOTE - HISTORY OF PRESENT ILLNESS
yo GP @  p/f. –VB, -LOF, -Ctx, +FM. denies fever, chills, nausea, vomiting, diarrhea, headache, constipation, dizziness, syncope, chest pain, palpitations, shortness of breath, dysuria, urgency, frequency.  PNC: [facility]  GBS:   EFW: #  ObHx: denies  GynHx: denies  MedHx: denies  PSHx: denies  PsychHx: denies  SocialHx: denies  AllergyHx: denies  RxHx: denies  25yo  @36w6d  p/f itchy palms x 4 days. Patient reports that over the past 4 days, she has had itchy palms and soles that is worse at night. She also reports dark urine of 1 day. Patient last saw OBGYN on 2/10/25. Patient's mother also had similar sx during her first pregnancy. –VB, -LOF, -Ctx, +FM. Patient reports nausea, well controlled w Zofran daily. Denies fever, chills, vomiting, diarrhea, headache, constipation, dizziness, syncope, chest pain, palpitations, shortness of breath, dysuria, urgency, frequency.  PNC: [facility]  GBS: unk  EFW: #  ObHx: denies  GynHx: denies  MedHx:  childhood asthma, reflux (on famotidine)  PSHx: cholecystectomy 2019  PsychHx: depression, previously on medication until pregnancy, but still seeing therapist  SocialHx: denies  AllergyHx: denies  RxHx: denies  25yo  @36w6d  p/f itchy palms x 4 days. Patient reports that over the past 4 days, she has had itchy palms and soles that is worse at night. She also reports dark urine of 1 day. Patient last saw OBGYN on 2/10/25. Patient's mother also had similar sx during her first pregnancy. –VB, -LOF, -Ctx, +FM. Patient reports nausea, well controlled w Zofran daily. Patient reports urinary frequency and mild suprapubic tenderness. Denies fever, chills, vomiting, diarrhea, headache, constipation, dizziness, syncope, chest pain, palpitations, shortness of breath,.  PNC: [facility]  GBS: unk  EFW: #  ObHx: denies  GynHx: denies  MedHx:  childhood asthma, reflux (on famotidine)  PSHx: cholecystectomy 2019  PsychHx: depression, previously on medication until pregnancy, but still seeing therapist  SocialHx: denies  AllergyHx: denies  RxHx: denies  25yo  @36w6d s/p cholecystectomy 2019 p/f itchy palms x 4 days. Patient reports that over the past 4 days, she has had itchy palms and soles that is worse at night. She also reports dark urine of 1 day. Patient last saw OBGYN on 2/10/25. Patient's mother also had similar sx during her first pregnancy. –VB, -LOF, -Ctx, +FM. Patient reports nausea, well controlled w Zofran daily. Patient reports urinary frequency and mild suprapubic tenderness. She endorses minimal lightheadedness/dizziness. Denies fever, chills, vomiting, diarrhea, headache, constipation, dizziness, syncope, chest pain, palpitations, shortness of breath,.  PNC: [facility]  GBS: unk  EFW:   ObHx: denies  GynHx: denies  MedHx:  childhood asthma, reflux (on famotidine)  PSHx: cholecystectomy 2019  PsychHx: depression, previously on medication until pregnancy, but still seeing therapist  SocialHx: denies  AllergyHx: denies  RxHx: denies  25yo  @36w6d s/p cholecystectomy 2019 p/f itchy palms x 4 days. Patient reports that over the past 4 days, she has had itchy palms and soles that is worse at night. She also reports dark urine of 1 day. Patient last saw OBGYN on 2/10/25. Patient's mother also had similar sx during her first pregnancy. –VB, -LOF, -Ctx, +FM. Patient reports nausea, well controlled w Zofran daily. Patient reports urinary frequency and mild suprapubic tenderness. She endorses minimal lightheadedness/dizziness. Denies fever, chills, vomiting, diarrhea, headache, constipation, dizziness, syncope, chest pain, palpitations, shortness of breath,.  PNC: [facility]  GBS: unk  EFW: 3038g extrapolated from sono 3w ago  ObHx: denies  GynHx: denies  MedHx:  childhood asthma, reflux (on famotidine)  PSHx: cholecystectomy 2019  PsychHx: depression, previously on medication until pregnancy, but still seeing therapist  SocialHx: denies  AllergyHx: denies  RxHx: denies

## 2025-02-12 NOTE — OB PROVIDER TRIAGE NOTE - NSOBPROVIDERNOTE_OBGYN_ALL_OB_FT
yo GP @ p/w  -admit to L&D  - for  - GBS  - EFW  - Routine Labs  - FHT/TOCO  - Anesthesia Consult  - Anticipate  23yo  @36w6d p/w itchy palms and soles x 4 days with 1 day of dark urine concerning for ICP  - observe in triage, f/u labs  - for  - GBS unk  - EFW  - Routine Labs  - FHT/TOCO  - Anesthesia Consult  - Anticipate  25yo  @36w6d p/w itchy palms and soles x 4 days with 1 day of dark urine concerning for ICP vs HELLP vs idiopathic pruritis   - observe in triage, f/u labs (HELLP with bile acids, coags, amylase and lipase, UA/UCx)  - for  - GBS unk  - EFW  - Routine Labs  - FHT/TOCO  - Anesthesia Consult  - Anticipate  23yo  @36w6d p/w itchy palms and soles x 4 days with 1 day of dark urine concerning for ICP vs HELLP vs idiopathic pruritis   - observe in triage, f/u labs (HELLP with bile acids, coags, amylase and lipase, UA/UCx)  - GBS unk  - EFW  - 500cc LR bolus w/maintenance 125cc/hr, Tums for reflux symptoms  - FH/TOCO    Dw Dr. Drake Angel PGY1     ADDENDUM  Pt endorsing improvement in lightheadedness/dizziness w/continued abdl cramping w/out consistent contractions. HELLP labs, amylase/lipase wnl. UA significant for bacteria and elevated LE c/w UTI.   Sent Macrobid 100 mg BID for UTI. Plan to f/u bile acids and UCx.     Michel Angel PGY1 25yo  @36w6d p/w itchy palms and soles x 4 days with 1 day of dark urine concerning for ICP vs HELLP vs idiopathic pruritis   - observe in triage, f/u labs (HELLP with bile acids, coags, amylase and lipase, UA/UCx)  - GBS unk  - EFW 3038g  - 500cc LR bolus w/maintenance 125cc/hr, Tums for reflux symptoms  - FH/TOCO    Dw Dr. Drake Angel PGY1     ADDENDUM  Pt endorsing improvement in lightheadedness/dizziness w/continued abdl cramping w/out consistent contractions. HELLP labs, amylase/lipase wnl. UA significant for bacteria and elevated LE c/w UTI.   Sent Macrobid 100 mg BID for UTI. Plan to f/u bile acids and UCx.     Michel Angel PGY1 23yo  @36w6d p/w itchy palms and soles x 4 days with 1 day of dark urine concerning for ICP vs HELLP vs idiopathic pruritis   - observe in triage, f/u labs (HELLP with bile acids, coags, amylase and lipase, UA/UCx)  - GBS unk  - EFW 3038g  - 500cc LR bolus w/maintenance 125cc/hr, Tums for reflux symptoms  - FH/TOCO    Dw Dr. Drake Angel PGY1     ADDENDUM  Pt endorsing improvement in lightheadedness/dizziness w/continued abdl cramping w/out consistent contractions. HELLP labs, amylase/lipase wnl. UA significant for bacteria and elevated LE c/w UTI.   Sent Macrobid 100 mg BID for UTI. Plan to f/u bile acids and UCx.     Dw Dr. Drake Angel PGY1    ATTG note    Read and agree with above PGY1 note    23 yo P0 @ 36.6 wks with c/o itchy palms and soles x 4 days.  ROS-dark urine  VSS, normotensive  Exam as above  DDX- ICP, other hepatic d/o, HELLP/PEC, UTI  NST-reactive  toco-irreg  LAbs- chem 20 nl  u/a- large LE, mnay WBC,   Bile acids pending  IVFH bolus  - No indication or dx or indication for delivery  - Clinically stable to dc to home to f/u this week in [facility]  - on f/u list of Bile acids    TERA Juan

## 2025-02-12 NOTE — OB PROVIDER TRIAGE NOTE - NSHPPHYSICALEXAM_GEN_ALL_CORE
Gen: NAD  CV: clinically well perfused  Pulm: unlabored respirations  Abd: soft, NT, ND, gravid, no rebound or guarding  SVE: //  FHT: , mod smita, + accels, - decels  TOCO: qm  Sono: cephalic Gen: NAD, itching palms and forearms  CV: clinically well perfused  Pulm: unlabored respirations  Abd: Mild suprapubic tenderness. Remaining abdomen is soft, NT, ND, gravid, no rebound or guarding. No CVA tenderness  SVE: //  FHT: , mod smita, + accels, - decels  TOCO: qm  Sono: cephalic Gen: NAD, itching palms and forearms  CV: clinically well perfused  Pulm: unlabored respirations  Abd: Mild suprapubic tenderness. Remaining abdomen is soft, NT, ND, gravid, no rebound or guarding. No CVA tenderness  SVE: deferred  FHT: baselien 120, mod smita, + accels, - decels  TOCO: irregular  Sono: cephalic

## 2025-02-13 VITALS
RESPIRATION RATE: 16 BRPM | SYSTOLIC BLOOD PRESSURE: 113 MMHG | DIASTOLIC BLOOD PRESSURE: 65 MMHG | HEART RATE: 58 BPM | TEMPERATURE: 98 F

## 2025-02-14 DIAGNOSIS — K21.9 GASTRO-ESOPHAGEAL REFLUX DISEASE WITHOUT ESOPHAGITIS: ICD-10-CM

## 2025-02-14 DIAGNOSIS — O99.513 DISEASES OF THE RESPIRATORY SYSTEM COMPLICATING PREGNANCY, THIRD TRIMESTER: ICD-10-CM

## 2025-02-14 DIAGNOSIS — L29.9 PRURITUS, UNSPECIFIED: ICD-10-CM

## 2025-02-14 DIAGNOSIS — Z3A.36 36 WEEKS GESTATION OF PREGNANCY: ICD-10-CM

## 2025-02-14 DIAGNOSIS — R11.0 NAUSEA: ICD-10-CM

## 2025-02-14 DIAGNOSIS — O99.713 DISEASES OF THE SKIN AND SUBCUTANEOUS TISSUE COMPLICATING PREGNANCY, THIRD TRIMESTER: ICD-10-CM

## 2025-02-14 DIAGNOSIS — O99.613 DISEASES OF THE DIGESTIVE SYSTEM COMPLICATING PREGNANCY, THIRD TRIMESTER: ICD-10-CM

## 2025-02-14 DIAGNOSIS — O99.343 OTHER MENTAL DISORDERS COMPLICATING PREGNANCY, THIRD TRIMESTER: ICD-10-CM

## 2025-02-14 DIAGNOSIS — J45.909 UNSPECIFIED ASTHMA, UNCOMPLICATED: ICD-10-CM

## 2025-02-14 DIAGNOSIS — O23.43 UNSPECIFIED INFECTION OF URINARY TRACT IN PREGNANCY, THIRD TRIMESTER: ICD-10-CM

## 2025-02-14 DIAGNOSIS — O26.893 OTHER SPECIFIED PREGNANCY RELATED CONDITIONS, THIRD TRIMESTER: ICD-10-CM

## 2025-02-14 DIAGNOSIS — R42 DIZZINESS AND GIDDINESS: ICD-10-CM

## 2025-02-14 DIAGNOSIS — F32.A DEPRESSION, UNSPECIFIED: ICD-10-CM

## 2025-02-15 LAB
-  AMOXICILLIN/CLAVULANIC ACID: SIGNIFICANT CHANGE UP
-  AMPICILLIN/SULBACTAM: SIGNIFICANT CHANGE UP
-  AMPICILLIN: SIGNIFICANT CHANGE UP
-  AZTREONAM: SIGNIFICANT CHANGE UP
-  CEFAZOLIN: SIGNIFICANT CHANGE UP
-  CEFEPIME: SIGNIFICANT CHANGE UP
-  CEFOXITIN: SIGNIFICANT CHANGE UP
-  CEFTRIAXONE: SIGNIFICANT CHANGE UP
-  CEFUROXIME: SIGNIFICANT CHANGE UP
-  CIPROFLOXACIN: SIGNIFICANT CHANGE UP
-  ERTAPENEM: SIGNIFICANT CHANGE UP
-  GENTAMICIN: SIGNIFICANT CHANGE UP
-  IMIPENEM: SIGNIFICANT CHANGE UP
-  LEVOFLOXACIN: SIGNIFICANT CHANGE UP
-  MEROPENEM: SIGNIFICANT CHANGE UP
-  NITROFURANTOIN: SIGNIFICANT CHANGE UP
-  PIPERACILLIN/TAZOBACTAM: SIGNIFICANT CHANGE UP
-  TOBRAMYCIN: SIGNIFICANT CHANGE UP
-  TRIMETHOPRIM/SULFAMETHOXAZOLE: SIGNIFICANT CHANGE UP
BILE AC FLD-MCNC: 82 UMOL/L — HIGH (ref 0–10)
CULTURE RESULTS: ABNORMAL
METHOD TYPE: SIGNIFICANT CHANGE UP
ORGANISM # SPEC MICROSCOPIC CNT: ABNORMAL
ORGANISM # SPEC MICROSCOPIC CNT: ABNORMAL
SPECIMEN SOURCE: SIGNIFICANT CHANGE UP

## 2025-02-18 ENCOUNTER — INPATIENT (INPATIENT)
Facility: HOSPITAL | Age: 25
LOS: 2 days | Discharge: HOME | End: 2025-02-21
Attending: OBSTETRICS & GYNECOLOGY | Admitting: OBSTETRICS & GYNECOLOGY
Payer: COMMERCIAL

## 2025-02-18 VITALS
DIASTOLIC BLOOD PRESSURE: 61 MMHG | SYSTOLIC BLOOD PRESSURE: 118 MMHG | HEART RATE: 87 BPM | TEMPERATURE: 98 F | OXYGEN SATURATION: 98 % | RESPIRATION RATE: 18 BRPM

## 2025-02-18 DIAGNOSIS — Z34.80 ENCOUNTER FOR SUPERVISION OF OTHER NORMAL PREGNANCY, UNSPECIFIED TRIMESTER: ICD-10-CM

## 2025-02-18 DIAGNOSIS — O26.899 OTHER SPECIFIED PREGNANCY RELATED CONDITIONS, UNSPECIFIED TRIMESTER: ICD-10-CM

## 2025-02-18 LAB
ADD ON TEST-SPECIMEN IN LAB: SIGNIFICANT CHANGE UP
ALBUMIN SERPL ELPH-MCNC: 3.4 G/DL — SIGNIFICANT CHANGE UP (ref 3.3–5)
ALP SERPL-CCNC: 322 U/L — HIGH (ref 40–120)
ALT FLD-CCNC: 199 U/L — HIGH (ref 10–45)
ANION GAP SERPL CALC-SCNC: 15 MMOL/L — SIGNIFICANT CHANGE UP (ref 5–17)
AST SERPL-CCNC: 154 U/L — HIGH (ref 10–40)
BASOPHILS # BLD AUTO: 0.01 K/UL — SIGNIFICANT CHANGE UP (ref 0–0.2)
BASOPHILS NFR BLD AUTO: 0.2 % — SIGNIFICANT CHANGE UP (ref 0–2)
BILIRUB SERPL-MCNC: 0.6 MG/DL — SIGNIFICANT CHANGE UP (ref 0.2–1.2)
BLD GP AB SCN SERPL QL: NEGATIVE — SIGNIFICANT CHANGE UP
BUN SERPL-MCNC: 9 MG/DL — SIGNIFICANT CHANGE UP (ref 7–23)
CALCIUM SERPL-MCNC: 9.4 MG/DL — SIGNIFICANT CHANGE UP (ref 8.4–10.5)
CHLORIDE SERPL-SCNC: 103 MMOL/L — SIGNIFICANT CHANGE UP (ref 96–108)
CO2 SERPL-SCNC: 21 MMOL/L — LOW (ref 22–31)
CREAT SERPL-MCNC: 0.86 MG/DL — SIGNIFICANT CHANGE UP (ref 0.5–1.3)
EGFR: 97 ML/MIN/1.73M2 — SIGNIFICANT CHANGE UP
EOSINOPHIL # BLD AUTO: 0.02 K/UL — SIGNIFICANT CHANGE UP (ref 0–0.5)
EOSINOPHIL NFR BLD AUTO: 0.3 % — SIGNIFICANT CHANGE UP (ref 0–6)
GLUCOSE SERPL-MCNC: 82 MG/DL — SIGNIFICANT CHANGE UP (ref 70–99)
HBV SURFACE AG SERPL QL IA: SIGNIFICANT CHANGE UP
HCT VFR BLD CALC: 35.1 % — SIGNIFICANT CHANGE UP (ref 34.5–45)
HCV AB S/CO SERPL IA: 0.05 S/CO — SIGNIFICANT CHANGE UP
HCV AB SERPL-IMP: SIGNIFICANT CHANGE UP
HGB BLD-MCNC: 11.6 G/DL — SIGNIFICANT CHANGE UP (ref 11.5–15.5)
IMM GRANULOCYTES NFR BLD AUTO: 0.3 % — SIGNIFICANT CHANGE UP (ref 0–0.9)
LYMPHOCYTES # BLD AUTO: 1.11 K/UL — SIGNIFICANT CHANGE UP (ref 1–3.3)
LYMPHOCYTES # BLD AUTO: 19.2 % — SIGNIFICANT CHANGE UP (ref 13–44)
MCHC RBC-ENTMCNC: 31.9 PG — SIGNIFICANT CHANGE UP (ref 27–34)
MCHC RBC-ENTMCNC: 33 G/DL — SIGNIFICANT CHANGE UP (ref 32–36)
MCV RBC AUTO: 96.4 FL — SIGNIFICANT CHANGE UP (ref 80–100)
MONOCYTES # BLD AUTO: 0.59 K/UL — SIGNIFICANT CHANGE UP (ref 0–0.9)
MONOCYTES NFR BLD AUTO: 10.2 % — SIGNIFICANT CHANGE UP (ref 2–14)
NEUTROPHILS # BLD AUTO: 4.04 K/UL — SIGNIFICANT CHANGE UP (ref 1.8–7.4)
NEUTROPHILS NFR BLD AUTO: 69.8 % — SIGNIFICANT CHANGE UP (ref 43–77)
NRBC BLD AUTO-RTO: 0 /100 WBCS — SIGNIFICANT CHANGE UP (ref 0–0)
PLATELET # BLD AUTO: 220 K/UL — SIGNIFICANT CHANGE UP (ref 150–400)
POTASSIUM SERPL-MCNC: 4.1 MMOL/L — SIGNIFICANT CHANGE UP (ref 3.5–5.3)
POTASSIUM SERPL-SCNC: 4.1 MMOL/L — SIGNIFICANT CHANGE UP (ref 3.5–5.3)
PROT SERPL-MCNC: 7.2 G/DL — SIGNIFICANT CHANGE UP (ref 6–8.3)
RBC # BLD: 3.64 M/UL — LOW (ref 3.8–5.2)
RBC # FLD: 13.5 % — SIGNIFICANT CHANGE UP (ref 10.3–14.5)
RH IG SCN BLD-IMP: POSITIVE — SIGNIFICANT CHANGE UP
RH IG SCN BLD-IMP: POSITIVE — SIGNIFICANT CHANGE UP
SODIUM SERPL-SCNC: 139 MMOL/L — SIGNIFICANT CHANGE UP (ref 135–145)
WBC # BLD: 5.79 K/UL — SIGNIFICANT CHANGE UP (ref 3.8–10.5)
WBC # FLD AUTO: 5.79 K/UL — SIGNIFICANT CHANGE UP (ref 3.8–10.5)

## 2025-02-18 RX ORDER — URSODIOL 300 MG/1
500 CAPSULE ORAL THREE TIMES A DAY
Refills: 0 | Status: DISCONTINUED | OUTPATIENT
Start: 2025-02-18 | End: 2025-02-19

## 2025-02-18 RX ORDER — SODIUM CHLORIDE 9 G/1000ML
1000 INJECTION, SOLUTION INTRAVENOUS
Refills: 0 | Status: DISCONTINUED | OUTPATIENT
Start: 2025-02-18 | End: 2025-02-19

## 2025-02-18 RX ORDER — OXYTOCIN-SODIUM CHLORIDE 0.9% IV SOLN 30 UNIT/500ML 30-0.9/5 UT/ML-%
167 SOLUTION INTRAVENOUS
Qty: 30 | Refills: 0 | Status: DISCONTINUED | OUTPATIENT
Start: 2025-02-18 | End: 2025-02-21

## 2025-02-18 RX ORDER — INFLUENZA A VIRUS A/IDAHO/07/2018 (H1N1) ANTIGEN (MDCK CELL DERIVED, PROPIOLACTONE INACTIVATED, INFLUENZA A VIRUS A/INDIANA/08/2018 (H3N2) ANTIGEN (MDCK CELL DERIVED, PROPIOLACTONE INACTIVATED), INFLUENZA B VIRUS B/SINGAPORE/INFTT-16-0610/2016 ANTIGEN (MDCK CELL DERIVED, PROPIOLACTONE INACTIVATED), INFLUENZA B VIRUS B/IOWA/06/2017 ANTIGEN (MDCK CELL DERIVED, PROPIOLACTONE INACTIVATED) 15; 15; 15; 15 UG/.5ML; UG/.5ML; UG/.5ML; UG/.5ML
0.5 INJECTION, SUSPENSION INTRAMUSCULAR ONCE
Refills: 0 | Status: DISCONTINUED | OUTPATIENT
Start: 2025-02-18 | End: 2025-02-21

## 2025-02-18 RX ORDER — CITRIC ACID/SODIUM CITRATE 300-500 MG
15 SOLUTION, ORAL ORAL EVERY 6 HOURS
Refills: 0 | Status: DISCONTINUED | OUTPATIENT
Start: 2025-02-18 | End: 2025-02-19

## 2025-02-18 RX ORDER — NITROFURANTOIN MACROCRYSTAL 100 MG
100 CAPSULE ORAL
Refills: 0 | Status: COMPLETED | OUTPATIENT
Start: 2025-02-18 | End: 2025-02-19

## 2025-02-18 RX ORDER — AMPICILLIN SODIUM 1 G/1
1 INJECTION, POWDER, FOR SOLUTION INTRAMUSCULAR; INTRAVENOUS EVERY 4 HOURS
Refills: 0 | Status: DISCONTINUED | OUTPATIENT
Start: 2025-02-18 | End: 2025-02-19

## 2025-02-18 RX ORDER — AMPICILLIN SODIUM 1 G/1
2 INJECTION, POWDER, FOR SOLUTION INTRAMUSCULAR; INTRAVENOUS ONCE
Refills: 0 | Status: COMPLETED | OUTPATIENT
Start: 2025-02-18 | End: 2025-02-18

## 2025-02-18 RX ADMIN — SODIUM CHLORIDE 125 MILLILITER(S): 9 INJECTION, SOLUTION INTRAVENOUS at 20:09

## 2025-02-18 RX ADMIN — SODIUM CHLORIDE 125 MILLILITER(S): 9 INJECTION, SOLUTION INTRAVENOUS at 21:38

## 2025-02-18 RX ADMIN — URSODIOL 500 MILLIGRAM(S): 300 CAPSULE ORAL at 23:24

## 2025-02-18 RX ADMIN — AMPICILLIN SODIUM 200 GRAM(S): 1 INJECTION, POWDER, FOR SOLUTION INTRAMUSCULAR; INTRAVENOUS at 21:38

## 2025-02-18 NOTE — OB PROVIDER H&P - HISTORY OF PRESENT ILLNESS
23yo  @37w5d presents for IOL for ICP (BA: 82.5). Patient initially presented to triage @36w6d with pruritus; BA sent and resulted today as 82.0 () consistent with ICP and patient was added on for induction today. –VB, -LOF, -Ctx, +FM.   GBS unk   PNC: [facility]    GBS: unk  EFW: 3038g extrapolated from sono 3w ago  ObHx: denies  GynHx: denies  MedHx:  childhood asthma, reflux (on famotidine)  PSHx: cholecystectomy 2019  PsychHx: depression, previously on medication until pregnancy, but still seeing therapist  SocialHx: denies  AllergyHx: denies  RxHx: denies  23yo  @37w5d presents for IOL for ICP (BA: 82.5). Patient initially presented to triage @36w6d with pruritus; BA sent and resulted today as 82.0 () consistent with ICP and patient was added on for induction today. –VB, -LOF, -Ctx, +FM.   GBS pos  PNC: [facility]    GBS: unk  EFW: 3038g extrapolated from sono 3w ago  ObHx: denies  GynHx: denies  MedHx:  childhood asthma, reflux (on famotidine)  PSHx: cholecystectomy   PsychHx: depression, previously on medication until pregnancy, but still seeing therapist  SocialHx: denies  AllergyHx: denies  RxHx: denies  25yo  @37w5d presents for IOL for ICP (BA: 82.5). Patient initially presented to triage @36w6d with pruritus; BA sent and resulted today as 82.0 () consistent with ICP and patient was added on for induction today. –VB, -LOF, -Ctx, +FM.   GBS pos  [facility]: [facility]    GBS: pos  EFW: 3038g extrapolated from sono 3w ago  ObHx: denies  GynHx: denies  MedHx:  childhood asthma, reflux (on famotidine)  PSHx: cholecystectomy   PsychHx: depression, previously on medication until pregnancy, but still seeing therapist  SocialHx: denies  AllergyHx: denies  RxHx: denies

## 2025-02-18 NOTE — OB RN PATIENT PROFILE - FALL HARM RISK - UNIVERSAL INTERVENTIONS
Bed in lowest position, wheels locked, appropriate side rails in place/Call bell, personal items and telephone in reach/Instruct patient to call for assistance before getting out of bed or chair/Non-slip footwear when patient is out of bed/[place] to call system/Physically safe environment - no spills, clutter or unnecessary equipment/Purposeful Proactive Rounding/Room/bathroom lighting operational, light cord in reach

## 2025-02-18 NOTE — OB PROVIDER IHI INDUCTION/AUGMENTATION NOTE - NS_IHIMETHOD_OBGYN_ALL_OB
Pitocin/Cytotec Vaginal/CRB, Cervical Ripening Balloon Cytotec Vaginal/CRB, Cervical Ripening Balloon

## 2025-02-18 NOTE — OB PROVIDER H&P - ATTENDING COMMENTS
/Attendiny/o  @37.5wks called in for IOL 2nd to ICP.  Pt was discussed with me; chart reviewed; pt seen at bedside and I agree with the above Resident's assessment and plan.    Pt was seen on L&D on  w/ c/o itching of palms, at which time Bile Acids were sent to r/o ICP; labs returned w/ Bile Acids of 82 and pt called to come for IOL.  Pt was also diagnosed with a UTI and was started on Macrobid (cultures sensitive) and is still taking.  Pt w/ PNC at [facility] and has been fairly uncomplicated, as per pt; took PO Iron for mild anemia.  Hx of childhood asthma, but no hospitalizations, or intubations and has not needed to use inhaler.    Prenatal labs reviewed on Pt's phone:  Blood Type: O+;  GBS: positive; HepB/C: neg; HIV: neg; RPR: neg    VSS, afebrile  Pt admits to still having itching of palms; otherwise denies any other complaints.  EFW: 3200gms (extrapolated from [facility].3wks ago)  FHT: BL: 140bpm; Cat-1  [place]: occasional irreg ctx's (not felt by pt)  SVE: /-3 @20:41  Memb: Intact    Labs:  H/H: 11.6/35.1; Plt: 220; Cr.: 0.8; AST/ALT: 154/199    A/P  -23y/o  @37.5wks called in for IOL 2nd to ICP (BA: 82) with transaminitis.  -Consent for management on L&D and for delivery obtained after R/B/A reviewed and all questions answered and pt voiced understanding.  -Pt with unfavorable cervix and will ripen the cervix w/ Vaginal Cytotec and Cervical balloon, which were placed @20:45.  -ICP w/ transaminitis; will continue to monitor LFT's.  -Anesthesia consult for pain management when desires  -SCD's for DVT prophylaxis while in bed.  -Low PPH risk and no need for PPH prophylaxis after delivery of placenta at this time; will continue to evaluate during intrapartum course.  -Anticipate vaginal delivery at this time    Dr. Keene /Attendiny/o  @37.5wks called in for IOL 2nd to ICP.  Pt was discussed with me; chart reviewed; pt seen at bedside and I agree with the above Resident's assessment and plan.    Pt was seen on L&D on  w/ c/o itching of palms, at which time Bile Acids were sent to r/o ICP; labs returned w/ Bile Acids of 82 and pt called to come for IOL.  Pt was also diagnosed with a UTI and was started on Macrobid (cultures sensitive) and is still taking.  Pt w/ PNC at [facility] and has been fairly uncomplicated, as per pt; took PO Iron for mild anemia.  Hx of childhood asthma, but no hospitalizations, or intubations and has not needed to use inhaler.    Prenatal labs reviewed on Pt's phone:  Blood Type: O+;  GBS: positive; HepB/C: neg; HIV: neg; RPR: neg    VSS, afebrile  Pt admits to still having itching of palms; otherwise denies any other complaints.  EFW: 3200gms (extrapolated from [place] [facility].3wks ago)  FHT: BL: 140bpm; Cat-1  [place]: occasional irreg ctx's (not felt by pt)  SVE: /-3 @20:41  Memb: Intact    Labs:  H/H: 11.6/35.1; Plt: 220; Cr.: 0.8; AST/ALT: 154/199    A/P  -23y/o  @37.5wks called in for IOL 2nd to ICP (BA: 82) with transaminitis.  -Consent for management on L&D and for delivery obtained after R/B/A reviewed and all questions answered and pt voiced understanding.  -Pt with unfavorable cervix and will ripen the cervix w/ Vaginal Cytotec and Cervical balloon, which were placed @20:45.  -GBS+ and to get Amp for GBS prophylaxis.  -ICP w/ transaminitis; will continue to monitor LFT's.  -Anesthesia consult for pain management when desires  -SCD's for DVT prophylaxis while in bed.  -Low PPH risk and no need for PPH prophylaxis after delivery of placenta at this time; will continue to evaluate during intrapartum course.  -Anticipate vaginal delivery at this time    Dr. Keene

## 2025-02-18 NOTE — OB RN PATIENT PROFILE - WEIGHT: TOTAL WEIGHT IN LBS
8/26/2022    Ms. Jah Ortiz  [address] [id number]      Dear Jah Diana:    Please find your most recent results below. Urine shows infection, cholesterol is trending up  Resulted Orders   MICROALBUMIN, UR, RAND W/ MICROALB/CREAT RATIO   Result Value Ref Range    Microalbumin,urine random <0.50 MG/DL    Creatinine, urine 36.80 mg/dL    Microalbumin/Creat ratio (mg/g creat) <14 0 - 30 mg/g   URINALYSIS W/ RFLX MICROSCOPIC   Result Value Ref Range    Color YELLOW/STRAW      Appearance CLEAR CLEAR      Specific gravity 1.005 1.003 - 1.030      pH (UA) 6.5 5.0 - 8.0      Protein Negative Negative mg/dL    Glucose Negative Negative mg/dL    Ketone Negative Negative mg/dL    Bilirubin Negative Negative      Blood Negative Negative      Urobilinogen 0.2 0.2 - 1.0 EU/dL    Nitrites Positive (A) Negative      Leukocyte Esterase SMALL (A) Negative     VITAMIN D, 25 HYDROXY   Result Value Ref Range    Vitamin D 25-Hydroxy 59.2 30 - 100 ng/mL   TSH 3RD GENERATION   Result Value Ref Range    TSH 0.83 0.36 - 3.74 uIU/mL   LIPID PANEL   Result Value Ref Range    Cholesterol, total 202 (H) <200 MG/DL    Triglyceride 86 <150 MG/DL    HDL Cholesterol 76 MG/DL    LDL, calculated 108.8 (H) 0 - 100 MG/DL    VLDL, calculated 17.2 MG/DL    CHOL/HDL Ratio 2.7 0.0 - 5.0     CBC WITH AUTOMATED DIFF   Result Value Ref Range    WBC 4.1 3.6 - 11.0 K/uL    RBC 5.07 3.80 - 5.20 M/uL    HGB 13.7 11.5 - 16.0 g/dL    HCT 43.1 35.0 - 47.0 %    MCV 85.0 80.0 - 99.0 FL    MCH 27.0 26.0 - 34.0 PG    MCHC 31.8 30.0 - 36.5 g/dL    RDW 15.8 (H) 11.5 - 14.5 %    PLATELET 183 079 - 128 K/uL    MPV 12.9 8.9 - 12.9 FL    NRBC 0.0 0  WBC    ABSOLUTE NRBC 0.00 0.00 - 0.01 K/uL    NEUTROPHILS 60 32 - 75 %    LYMPHOCYTES 32 12 - 49 %    MONOCYTES 6 5 - 13 %    EOSINOPHILS 1 0 - 7 %    BASOPHILS 1 0 - 1 %    IMMATURE GRANULOCYTES 0 0.0 - 0.5 %    ABS. NEUTROPHILS 2.5 1.8 - 8.0 K/UL    ABS. LYMPHOCYTES 1.3 0.8 - 3.5 K/UL    ABS. MONOCYTES 0.3 0.0 - 1.0 K/UL    ABS. EOSINOPHILS 0.1 0.0 - 0.4 K/UL    ABS. BASOPHILS 0.0 0.0 - 0.1 K/UL    ABS. IMM. GRANS. 0.0 0.00 - 0.04 K/UL    DF AUTOMATED     METABOLIC PANEL, COMPREHENSIVE   Result Value Ref Range    Sodium 136 136 - 145 mmol/L    Potassium 3.5 3.5 - 5.1 mmol/L    Chloride 100 97 - 108 mmol/L    CO2 26 21 - 32 mmol/L    Anion gap 10 5 - 15 mmol/L    Glucose 111 (H) 65 - 100 mg/dL    BUN 16 6 - 20 MG/DL    Creatinine 0.75 0.55 - 1.02 MG/DL    BUN/Creatinine ratio 21 (H) 12 - 20      GFR est AA >60 >60 ml/min/1.73m2    GFR est non-AA >60 >60 ml/min/1.73m2    Calcium 9.9 8.5 - 10.1 MG/DL    Bilirubin, total 0.7 0.2 - 1.0 MG/DL    ALT (SGPT) 26 12 - 78 U/L    AST (SGOT) 20 15 - 37 U/L    Alk. phosphatase 74 45 - 117 U/L    Protein, total 8.0 6.4 - 8.2 g/dL    Albumin 4.2 3.5 - 5.0 g/dL    Globulin 3.8 2.0 - 4.0 g/dL    A-G Ratio 1.1 1.1 - 2.2     URINE MICROSCOPIC ONLY   Result Value Ref Range    WBC 0-4 0 - 4 /hpf    RBC 0-5 0 - 5 /hpf    Epithelial cells FEW FEW /lpf    Bacteria 2+ (A) Negative /hpf       RECOMMENDATIONS:  Work on diet and exercise. Continue with current  diet and medications.  Antibiotic for uti is in the pharmacy  Please call me if you have any questions: [phone]    Sincerely,      Jackeline Godinez MD 30

## 2025-02-18 NOTE — OB PROVIDER H&P - ASSESSMENT
A&P: 23yo  @37w5d presents for IOL for ICP (BA: 82.5). Patient initially presented to triage @36w6d with pruritus; BA sent and resulted today as 82.0 () consistent with ICP and patient was added on for induction today.   Labor: admit to L&D  -vaginal cytotec and cervical balloon   -HELLP labs   -EFM/toco  -NPO, IV hydration  Fetal: cat 1 tracing, fetal status reassuring  GBS: unk  Analgesia: epi prn       Discussed with Dr. Sammi Sarabia PGY-2 A&P: 23yo  @37w5d presents for IOL for ICP (BA: 82.5). Patient initially presented to triage @36w6d with pruritus; BA sent and resulted today as 82.0 () consistent with ICP and patient was added on for induction today.   Labor: admit to L&D  -vaginal cytotec and cervical balloon   -HELLP labs   -EFM/toco  -NPO, IV hydration  Fetal: cat 1 tracing, fetal status reassuring  GBS: pos  Analgesia: epi prn       Discussed with Dr. Sammi Sarabia PGY-2

## 2025-02-18 NOTE — CHART NOTE - NSCHARTNOTEFT_GEN_A_CORE
Attempted to contact patient via provided telephone number [phone] to explain elevated bile acids and positive urine culture. Called x2, voicemail box not set up. Email sent to [facility] to establish care.     Del Angel PGY1
R3 OB Chart Note    In short, pt is a 24y  at 37w5d who presented at 36w6d with pruritus; BA sent and resulted as 82.0 () consistent with ICP.    Attempted to contact pt and pt's emergency contact via telephone multiple times this afternoon, however with no response noted. Pt emailed and requested to call L&D urgently.    Pt called L&D at 415pm and I spoke with the pt. Notified pt of dx of ICP and recommended pt present to L&D today for IOL. Discussed with pt risks of ICP including fetal distress and stillbirth. Pt reported she will present to L&D later today for IOL. Questions answered.    D/w DELFINA Petit attending and DELFINA Smith fellow  [facility] PGY3

## 2025-02-19 LAB
HIV 1 & 2 AB SERPL IA.RAPID: SIGNIFICANT CHANGE UP
HIV 1+2 AB+HIV1 P24 AG SERPL QL IA: SIGNIFICANT CHANGE UP
T PALLIDUM AB TITR SER: NEGATIVE — SIGNIFICANT CHANGE UP

## 2025-02-19 PROCEDURE — 59409 OBSTETRICAL CARE: CPT | Mod: U7,GC

## 2025-02-19 RX ORDER — OXYTOCIN-SODIUM CHLORIDE 0.9% IV SOLN 30 UNIT/500ML 30-0.9/5 UT/ML-%
2 SOLUTION INTRAVENOUS
Qty: 30 | Refills: 0 | Status: DISCONTINUED | OUTPATIENT
Start: 2025-02-19 | End: 2025-02-19

## 2025-02-19 RX ORDER — HYDROCORTISONE 10 MG/G
1 CREAM TOPICAL EVERY 6 HOURS
Refills: 0 | Status: DISCONTINUED | OUTPATIENT
Start: 2025-02-19 | End: 2025-02-21

## 2025-02-19 RX ORDER — IBUPROFEN 200 MG
600 TABLET ORAL EVERY 6 HOURS
Refills: 0 | Status: COMPLETED | OUTPATIENT
Start: 2025-02-19 | End: 2026-01-18

## 2025-02-19 RX ORDER — OXYCODONE HYDROCHLORIDE 30 MG/1
5 TABLET ORAL ONCE
Refills: 0 | Status: DISCONTINUED | OUTPATIENT
Start: 2025-02-19 | End: 2025-02-21

## 2025-02-19 RX ORDER — SODIUM CHLORIDE 9 G/1000ML
1000 INJECTION, SOLUTION INTRAVENOUS ONCE
Refills: 0 | Status: COMPLETED | OUTPATIENT
Start: 2025-02-19 | End: 2025-02-19

## 2025-02-19 RX ORDER — SIMETHICONE 80 MG
80 TABLET,CHEWABLE ORAL EVERY 4 HOURS
Refills: 0 | Status: DISCONTINUED | OUTPATIENT
Start: 2025-02-19 | End: 2025-02-21

## 2025-02-19 RX ORDER — PRAMOXINE HCL 1 %
1 GEL (GRAM) TOPICAL EVERY 4 HOURS
Refills: 0 | Status: DISCONTINUED | OUTPATIENT
Start: 2025-02-19 | End: 2025-02-21

## 2025-02-19 RX ORDER — DIPHENHYDRAMINE HCL 12.5MG/5ML
25 ELIXIR ORAL EVERY 6 HOURS
Refills: 0 | Status: DISCONTINUED | OUTPATIENT
Start: 2025-02-19 | End: 2025-02-21

## 2025-02-19 RX ORDER — BENZOCAINE 220 MG/G
1 SPRAY, METERED PERIODONTAL EVERY 6 HOURS
Refills: 0 | Status: DISCONTINUED | OUTPATIENT
Start: 2025-02-19 | End: 2025-02-21

## 2025-02-19 RX ORDER — OXYTOCIN-SODIUM CHLORIDE 0.9% IV SOLN 30 UNIT/500ML 30-0.9/5 UT/ML-%
333 SOLUTION INTRAVENOUS
Qty: 30 | Refills: 0 | Status: DISCONTINUED | OUTPATIENT
Start: 2025-02-19 | End: 2025-02-21

## 2025-02-19 RX ORDER — KETOROLAC TROMETHAMINE 30 MG/ML
30 INJECTION, SOLUTION INTRAMUSCULAR; INTRAVENOUS ONCE
Refills: 0 | Status: DISCONTINUED | OUTPATIENT
Start: 2025-02-19 | End: 2025-02-19

## 2025-02-19 RX ORDER — OXYTOCIN-SODIUM CHLORIDE 0.9% IV SOLN 30 UNIT/500ML 30-0.9/5 UT/ML-%
2 SOLUTION INTRAVENOUS
Qty: 30 | Refills: 0 | Status: DISCONTINUED | OUTPATIENT
Start: 2025-02-19 | End: 2025-02-21

## 2025-02-19 RX ORDER — DIBUCAINE 10 MG/G
1 OINTMENT TOPICAL EVERY 6 HOURS
Refills: 0 | Status: DISCONTINUED | OUTPATIENT
Start: 2025-02-19 | End: 2025-02-21

## 2025-02-19 RX ORDER — CLOSTRIDIUM TETANI TOXOID ANTIGEN (FORMALDEHYDE INACTIVATED), CORYNEBACTERIUM DIPHTHERIAE TOXOID ANTIGEN (FORMALDEHYDE INACTIVATED), BORDETELLA PERTUSSIS TOXOID ANTIGEN (GLUTARALDEHYDE INACTIVATED), BORDETELLA PERTUSSIS FILAMENTOUS HEMAGGLUTININ ANTIGEN (FORMALDEHYDE INACTIVATED), BORDETELLA PERTUSSIS PERTACTIN ANTIGEN, AND BORDETELLA PERTUSSIS FIMBRIAE 2/3 ANTIGEN 5; 2; 2.5; 5; 3; 5 [LF]/.5ML; [LF]/.5ML; UG/.5ML; UG/.5ML; UG/.5ML; UG/.5ML
0.5 INJECTION, SUSPENSION INTRAMUSCULAR ONCE
Refills: 0 | Status: DISCONTINUED | OUTPATIENT
Start: 2025-02-19 | End: 2025-02-21

## 2025-02-19 RX ORDER — MAGNESIUM HYDROXIDE 400 MG/5ML
30 SUSPENSION ORAL
Refills: 0 | Status: DISCONTINUED | OUTPATIENT
Start: 2025-02-19 | End: 2025-02-21

## 2025-02-19 RX ORDER — IBUPROFEN 200 MG
600 TABLET ORAL EVERY 6 HOURS
Refills: 0 | Status: DISCONTINUED | OUTPATIENT
Start: 2025-02-19 | End: 2025-02-21

## 2025-02-19 RX ORDER — PRENATAL 136/IRON/FOLIC ACID 27 MG-1 MG
1 TABLET ORAL DAILY
Refills: 0 | Status: DISCONTINUED | OUTPATIENT
Start: 2025-02-19 | End: 2025-02-21

## 2025-02-19 RX ORDER — MODIFIED LANOLIN 100 %
1 CREAM (GRAM) TOPICAL EVERY 6 HOURS
Refills: 0 | Status: DISCONTINUED | OUTPATIENT
Start: 2025-02-19 | End: 2025-02-21

## 2025-02-19 RX ORDER — WITCH HAZEL LEAF
1 FLUID EXTRACT MISCELLANEOUS EVERY 4 HOURS
Refills: 0 | Status: DISCONTINUED | OUTPATIENT
Start: 2025-02-19 | End: 2025-02-21

## 2025-02-19 RX ORDER — ACETAMINOPHEN 500 MG/5ML
975 LIQUID (ML) ORAL
Refills: 0 | Status: DISCONTINUED | OUTPATIENT
Start: 2025-02-19 | End: 2025-02-19

## 2025-02-19 RX ORDER — OXYCODONE HYDROCHLORIDE 30 MG/1
5 TABLET ORAL
Refills: 0 | Status: DISCONTINUED | OUTPATIENT
Start: 2025-02-19 | End: 2025-02-21

## 2025-02-19 RX ADMIN — Medication 600 MILLIGRAM(S): at 22:30

## 2025-02-19 RX ADMIN — Medication 100 MILLIGRAM(S): at 17:18

## 2025-02-19 RX ADMIN — SODIUM CHLORIDE 1000 MILLILITER(S): 9 INJECTION, SOLUTION INTRAVENOUS at 12:22

## 2025-02-19 RX ADMIN — Medication 600 MILLIGRAM(S): at 21:22

## 2025-02-19 RX ADMIN — URSODIOL 500 MILLIGRAM(S): 300 CAPSULE ORAL at 05:23

## 2025-02-19 RX ADMIN — OXYTOCIN-SODIUM CHLORIDE 0.9% IV SOLN 30 UNIT/500ML 2 MILLIUNIT(S)/MIN: 30-0.9/5 SOLUTION at 03:56

## 2025-02-19 RX ADMIN — Medication 100 MILLIGRAM(S): at 05:23

## 2025-02-19 RX ADMIN — AMPICILLIN SODIUM 100 GRAM(S): 1 INJECTION, POWDER, FOR SOLUTION INTRAMUSCULAR; INTRAVENOUS at 13:04

## 2025-02-19 RX ADMIN — AMPICILLIN SODIUM 100 GRAM(S): 1 INJECTION, POWDER, FOR SOLUTION INTRAMUSCULAR; INTRAVENOUS at 09:27

## 2025-02-19 RX ADMIN — KETOROLAC TROMETHAMINE 30 MILLIGRAM(S): 30 INJECTION, SOLUTION INTRAMUSCULAR; INTRAVENOUS at 15:35

## 2025-02-19 RX ADMIN — Medication 15 MILLILITER(S): at 07:07

## 2025-02-19 RX ADMIN — AMPICILLIN SODIUM 100 GRAM(S): 1 INJECTION, POWDER, FOR SOLUTION INTRAMUSCULAR; INTRAVENOUS at 01:51

## 2025-02-19 RX ADMIN — OXYTOCIN-SODIUM CHLORIDE 0.9% IV SOLN 30 UNIT/500ML 2 MILLIUNIT(S)/MIN: 30-0.9/5 SOLUTION at 06:05

## 2025-02-19 RX ADMIN — AMPICILLIN SODIUM 100 GRAM(S): 1 INJECTION, POWDER, FOR SOLUTION INTRAMUSCULAR; INTRAVENOUS at 05:25

## 2025-02-19 RX ADMIN — OXYTOCIN-SODIUM CHLORIDE 0.9% IV SOLN 30 UNIT/500ML 333 MILLIUNIT(S)/MIN: 30-0.9/5 SOLUTION at 14:13

## 2025-02-19 NOTE — OB RN DELIVERY SUMMARY - NSSELHIDDEN_OBGYN_ALL_OB_FT
[NS_DeliveryAttending1_OBGYN_ALL_OB_FT:[email or web address]=],[NS_DeliveryAssist1_OBGYN_ALL_OB_FT:[id number]=],[NS_DeliveryRN_OBGYN_ALL_OB_FT:[id number]=]

## 2025-02-19 NOTE — OB PROVIDER LABOR PROGRESS NOTE - ASSESSMENT
A/P: 23y/o  @37w6d IOL for ICP   - Labor: spVC/CB, Pit@4a  - Fetus: cat 1  - GBS: Amp  - Pain: Epidural, well controlled.     Patient progressing slowly. Pitocin at 2u/h with irregular ctx pattern. Will increase 2x2 to break pattern    Uzma Haider, PGY2  d/w Dr. Chapa

## 2025-02-19 NOTE — OB PROVIDER LABOR PROGRESS NOTE - NS_OBIHIFHRDETAILS_OBGYN_ALL_OB_FT
130/mod/+accels/-decels
baseline 125, mod variability, + accels, no decels
baseline 145  mod smita  +accels  -decels

## 2025-02-19 NOTE — OB PROVIDER LABOR PROGRESS NOTE - ASSESSMENT
Will continue with pitocin.    Mayda Burroughs, PGY1 Will continue with pitocin.    Mayda Burroughs, PGY1  Discussed with Dr. Cho

## 2025-02-19 NOTE — OB PROVIDER LABOR PROGRESS NOTE - ASSESSMENT
24y  @37w6d here for IOL for ICP  sp VC  CB in vault, removed  VE 3/50/-3  Low dose pitocin for further cervical ripening  Cont fetal/maternal monitoring  Will reassess PRN    dw Dr. Sammi Angel PGY1

## 2025-02-19 NOTE — OB PROVIDER DELIVERY SUMMARY - NSPROVIDERDELIVERYNOTE_OBGYN_ALL_OB_FT
Spontaneous vaginal delivery of liveborn infant from OA position. Head, shoulders, and body delivered easily. Infant passed to mother. Cord was clamped and cut after delayed cord clamping. Placenta delivered spontaneously, noted to be intact. Fundal massage was given and uterine fundus was found to be firm. Vaginal exam revealed intact cervix, sulci, vaginal walls, and perineum. Periurethral laceration noted and repaired in standard fashion with vicryl suture. Excellent hemostasis was noted. Patient stable. Count correct x 2.    Mayda Burroughs, PGY-1  Delivery with Dr. Cho and Dr. Medley, PGY4 Spontaneous vaginal delivery of liveborn infant from OA position. Head, shoulders, and body delivered easily. Infant passed to mother. Cord was clamped and cut after delayed cord clamping. Placenta delivered spontaneously, noted to be intact. Fundal massage was given and uterine fundus was found to be firm. Vaginal exam revealed intact cervix, sulci, vaginal walls, and perineum. Periurethral laceration noted and repaired in standard fashion with vicryl suture. Excellent hemostasis was noted. Patient stable. Count correct x 2.    Mayda Burroughs, PGY-1  Delivery with Dr. Cho and Dr. Medley, PGY4    Agree with above  Kathy CAMARGO

## 2025-02-19 NOTE — OB PROVIDER LABOR PROGRESS NOTE - NS_SUBJECTIVE/OBJECTIVE_OBGYN_ALL_OB_FT
Patient seen and examined at bedside.  Effective epidural in situ.    T(C): 36.7 (02-19-25 @ 01:59), Max: 36.9 (02-18-25 @ 20:01)  HR: 68 (02-19-25 @ 03:03) (57 - 97)  BP: 114/65 (02-19-25 @ 02:44) (114/65 - 129/68)  RR: 18 (02-19-25 @ 01:59) (18 - 18)  SpO2: 99% (02-19-25 @ 03:03) (96% - 100%)
comfortable with epidural
Pt reexamined for increasing pressure.

## 2025-02-19 NOTE — OB RN DELIVERY SUMMARY - NS_SEPSISRSKCALC_OBGYN_ALL_OB_FT
EOS calculated successfully. EOS Risk Factor: 0.5/1000 live births ([facility] national incidence); GA=37w6d; Temp=99.5; ROM=2.917; GBS='Unknown'; Antibiotics='GBS specific antibiotics > 2 hrs prior to birth'

## 2025-02-19 NOTE — OB PROVIDER DELIVERY SUMMARY - NSSELHIDDEN_OBGYN_ALL_OB_FT
[NS_DeliveryAttending1_OBGYN_ALL_OB_FT:[email or web address]=],[NS_DeliveryAssist1_OBGYN_ALL_OB_FT:[id number]=]

## 2025-02-19 NOTE — OB PROVIDER LABOR PROGRESS NOTE - ASSESSMENT
OB attg note    Pt seen and examined. 25yo  at 37+6 here for IOL for ICP with BA 82 and elev LFTs 154/199. Pt with hx of childhood asthma and anemia prev on PO supplementation. S/p vag cyto and CB, started on pit at 4am and AROMed on this exam. Continue pit titration. GBS pos on amp, after AROM noted to have intermittent variable decels but prev cat 1. Consider AI if recurrent variables. Anticipate . EFW 3200.     ICU Vital Signs Last 24 Hrs  T(C): 36.6 (2025 11:34), Max: 36.9 (2025 20:01)  T(F): 97.88 (2025 11:34), Max: 98.42 (2025 20:01)  HR: 61 (2025 12:09) (55 - 97)  BP: 114/70 (2025 11:52) (100/57 - 129/68)  BP(mean): --  ABP: --  ABP(mean): --  RR: 16 (2025 11:34) (16 - 18)  SpO2: 97% (2025 12:09) (96% - 100%)    O2 Parameters below as of 2025 20:56  Patient On (Oxygen Delivery Method): room air                              11.6   5.79  )-----------( 220      ( 2025 20:33 )             35.1     Kathy CAMARGO

## 2025-02-20 LAB
ALBUMIN SERPL ELPH-MCNC: 2.5 G/DL — LOW (ref 3.3–5)
ALP SERPL-CCNC: 219 U/L — HIGH (ref 40–120)
ALT FLD-CCNC: 145 U/L — HIGH (ref 10–45)
ANION GAP SERPL CALC-SCNC: 11 MMOL/L — SIGNIFICANT CHANGE UP (ref 5–17)
AST SERPL-CCNC: 108 U/L — HIGH (ref 10–40)
BILIRUB SERPL-MCNC: 0.3 MG/DL — SIGNIFICANT CHANGE UP (ref 0.2–1.2)
BUN SERPL-MCNC: 10 MG/DL — SIGNIFICANT CHANGE UP (ref 7–23)
CALCIUM SERPL-MCNC: 8.9 MG/DL — SIGNIFICANT CHANGE UP (ref 8.4–10.5)
CHLORIDE SERPL-SCNC: 105 MMOL/L — SIGNIFICANT CHANGE UP (ref 96–108)
CO2 SERPL-SCNC: 22 MMOL/L — SIGNIFICANT CHANGE UP (ref 22–31)
CREAT SERPL-MCNC: 0.72 MG/DL — SIGNIFICANT CHANGE UP (ref 0.5–1.3)
EGFR: 120 ML/MIN/1.73M2 — SIGNIFICANT CHANGE UP
GLUCOSE SERPL-MCNC: 78 MG/DL — SIGNIFICANT CHANGE UP (ref 70–99)
POTASSIUM SERPL-MCNC: 4 MMOL/L — SIGNIFICANT CHANGE UP (ref 3.5–5.3)
POTASSIUM SERPL-SCNC: 4 MMOL/L — SIGNIFICANT CHANGE UP (ref 3.5–5.3)
PROT SERPL-MCNC: 5.4 G/DL — LOW (ref 6–8.3)
SODIUM SERPL-SCNC: 138 MMOL/L — SIGNIFICANT CHANGE UP (ref 135–145)

## 2025-02-20 RX ORDER — DEXTROMETHORPHAN HBR, GUAIFENESIN 200 MG/10ML
100 LIQUID ORAL EVERY 6 HOURS
Refills: 0 | Status: DISCONTINUED | OUTPATIENT
Start: 2025-02-20 | End: 2025-02-21

## 2025-02-20 RX ADMIN — Medication 600 MILLIGRAM(S): at 13:10

## 2025-02-20 RX ADMIN — Medication 600 MILLIGRAM(S): at 05:13

## 2025-02-20 RX ADMIN — Medication 600 MILLIGRAM(S): at 18:24

## 2025-02-20 RX ADMIN — Medication 600 MILLIGRAM(S): at 06:15

## 2025-02-20 RX ADMIN — Medication 600 MILLIGRAM(S): at 23:06

## 2025-02-20 RX ADMIN — Medication 1 TABLET(S): at 12:39

## 2025-02-20 RX ADMIN — Medication 600 MILLIGRAM(S): at 17:54

## 2025-02-20 RX ADMIN — Medication 600 MILLIGRAM(S): at 12:40

## 2025-02-20 NOTE — PROGRESS NOTE ADULT - SUBJECTIVE AND OBJECTIVE BOX
25yo  PPD#1 s/p . Patient feels well. Pain is well controlled, tolerating regular diet, passing flatus, voiding spontaneously, ambulating without difficulty. Denies heavy vaginal bleeding, CP/SOB, N/V, lightheadedness/dizziness.     O:  Vitals:  Vital Signs Last 24 Hrs  T(C): 36.3 (2025 06:00), Max: 37.5 (2025 14:31)  T(F): 97.4 (2025 06:00), Max: 99.5 (2025 14:31)  HR: 64 (2025 06:00) (50 - 100)  BP: 101/66 (2025 06:00) (100/57 - 122/75)  BP(mean): --  RR: 18 (2025 06:00) (16 - 18)  SpO2: 96% (2025 06:00) (90% - 100%)    Parameters below as of 2025 06:00  Patient On (Oxygen Delivery Method): room air        MEDICATIONS  (STANDING):  diphtheria/tetanus/pertussis (acellular) Vaccine (Adacel) 0.5 milliLiter(s) IntraMuscular once  ibuprofen  Tablet. 600 milliGRAM(s) Oral every 6 hours  influenza   Vaccine 0.5 milliLiter(s) IntraMuscular once  oxytocin Infusion 333 milliUNIT(s)/Min (333 mL/Hr) IV Continuous <Continuous>  oxytocin Infusion 167 milliUNIT(s)/Min (167 mL/Hr) IV Continuous <Continuous>  oxytocin Infusion. 2 milliUNIT(s)/Min (2 mL/Hr) IV Continuous <Continuous>  prenatal multivitamin 1 Tablet(s) Oral daily  sodium chloride 0.9% lock flush 3 milliLiter(s) IV Push every 8 hours      MEDICATIONS  (PRN):  benzocaine 20%/menthol 0.5% Spray 1 Spray(s) Topical every 6 hours PRN for Perineal discomfort  dibucaine 1% Ointment 1 Application(s) Topical every 6 hours PRN Perineal discomfort  diphenhydrAMINE 25 milliGRAM(s) Oral every 6 hours PRN Pruritus  hydrocortisone 1% Cream 1 Application(s) Topical every 6 hours PRN Moderate Pain (4-6)  lanolin Ointment 1 Application(s) Topical every 6 hours PRN nipple soreness  magnesium hydroxide Suspension 30 milliLiter(s) Oral two times a day PRN Constipation  oxyCODONE    IR 5 milliGRAM(s) Oral every 3 hours PRN Moderate to Severe Pain (4-10)  oxyCODONE    IR 5 milliGRAM(s) Oral once PRN Moderate to Severe Pain (4-10)  pramoxine 1%/zinc 5% Cream 1 Application(s) Topical every 4 hours PRN Moderate Pain (4-6)  simethicone 80 milliGRAM(s) Chew every 4 hours PRN Gas  witch hazel Pads 1 Application(s) Topical every 4 hours PRN Perineal discomfort      Labs:  Blood type: O Positive  Rubella IgG: RPR: Negative                          11.6   5.79 >-----------< 220    (  @ 20:33 )             35.1    25 @ 20:33      139  |  103  |  9   ----------------------------<  82  4.1   |  21[L]  |  0.86        Ca    9.4      2025 20:33    TPro  7.2  /  Alb  3.4  /  TBili  0.6  /  DBili  x   /  AST  154[H]  /  ALT  199[H]  /  AlkPhos  322[H]  25 @ 20:33          Physical Exam:  General: NAD  Abdomen: soft, non-tender, non-distended, fundus firm  Vaginal: No heavy vaginal bleeding  Extremities: No erythema/edema

## 2025-02-21 VITALS
SYSTOLIC BLOOD PRESSURE: 113 MMHG | HEART RATE: 92 BPM | RESPIRATION RATE: 18 BRPM | TEMPERATURE: 98 F | OXYGEN SATURATION: 96 % | DIASTOLIC BLOOD PRESSURE: 67 MMHG

## 2025-02-21 PROCEDURE — 86803 HEPATITIS C AB TEST: CPT

## 2025-02-21 PROCEDURE — 85025 COMPLETE CBC W/AUTO DIFF WBC: CPT

## 2025-02-21 PROCEDURE — 87340 HEPATITIS B SURFACE AG IA: CPT

## 2025-02-21 PROCEDURE — 86780 TREPONEMA PALLIDUM: CPT

## 2025-02-21 PROCEDURE — 86901 BLOOD TYPING SEROLOGIC RH(D): CPT

## 2025-02-21 PROCEDURE — 36415 COLL VENOUS BLD VENIPUNCTURE: CPT

## 2025-02-21 PROCEDURE — 86900 BLOOD TYPING SEROLOGIC ABO: CPT

## 2025-02-21 PROCEDURE — 86703 HIV-1/HIV-2 1 RESULT ANTBDY: CPT

## 2025-02-21 PROCEDURE — 59050 FETAL MONITOR W/REPORT: CPT

## 2025-02-21 PROCEDURE — 80053 COMPREHEN METABOLIC PANEL: CPT

## 2025-02-21 PROCEDURE — 87389 HIV-1 AG W/HIV-1&-2 AB AG IA: CPT

## 2025-02-21 PROCEDURE — 86762 RUBELLA ANTIBODY: CPT

## 2025-02-21 PROCEDURE — 86850 RBC ANTIBODY SCREEN: CPT

## 2025-02-21 RX ORDER — PRENATAL 136/IRON/FOLIC ACID 27 MG-1 MG
1 TABLET ORAL
Qty: 0 | Refills: 0 | DISCHARGE
Start: 2025-02-21

## 2025-02-21 RX ORDER — DEXTROMETHORPHAN HBR, GUAIFENESIN 200 MG/10ML
100 LIQUID ORAL EVERY 6 HOURS
Refills: 0 | Status: DISCONTINUED | OUTPATIENT
Start: 2025-02-21 | End: 2025-02-21

## 2025-02-21 RX ORDER — SODIUM CHLORIDE 0.65 %
1 AEROSOL, SPRAY (ML) NASAL THREE TIMES A DAY
Refills: 0 | Status: DISCONTINUED | OUTPATIENT
Start: 2025-02-21 | End: 2025-02-21

## 2025-02-21 RX ORDER — WITCH HAZEL LEAF
1 FLUID EXTRACT MISCELLANEOUS
Qty: 0 | Refills: 0 | DISCHARGE
Start: 2025-02-21

## 2025-02-21 RX ORDER — IBUPROFEN 200 MG
1 TABLET ORAL
Qty: 0 | Refills: 0 | DISCHARGE
Start: 2025-02-21

## 2025-02-21 RX ORDER — MODIFIED LANOLIN 100 %
1 CREAM (GRAM) TOPICAL
Qty: 0 | Refills: 0 | DISCHARGE
Start: 2025-02-21

## 2025-02-21 RX ADMIN — Medication 1 TABLET(S): at 12:31

## 2025-02-21 RX ADMIN — Medication 600 MILLIGRAM(S): at 13:01

## 2025-02-21 RX ADMIN — Medication 600 MILLIGRAM(S): at 12:31

## 2025-02-21 RX ADMIN — Medication 600 MILLIGRAM(S): at 00:00

## 2025-02-21 RX ADMIN — Medication 600 MILLIGRAM(S): at 05:19

## 2025-02-21 RX ADMIN — DEXTROMETHORPHAN HBR, GUAIFENESIN 100 MILLIGRAM(S): 200 LIQUID ORAL at 00:34

## 2025-02-21 NOTE — DISCHARGE NOTE OB - HOSPITAL COURSE
Patient had an uncomplicated  followed by an uncomplicated postpartum course. . Prenatal course with ICP and transaminitis. AST/ ALT: 39/36->154/199->108/145. On postpartum day 2, patient was discharged home in stable condition, voiding spontaneously and with normal vital signs.

## 2025-02-21 NOTE — DISCHARGE NOTE OB - FINANCIAL ASSISTANCE
[facility] provides services at a reduced cost to those who are determined to be eligible through [facility]’s financial assistance program. Information regarding [facility]’s financial assistance program can be found by going to [email or web address] or by calling [phone].

## 2025-02-21 NOTE — DISCHARGE NOTE OB - PATIENT PORTAL LINK FT
You can access the FollowMyHealth Patient Portal offered by [facility] by registering at the following website: [email or web address]. By joining [facility]’s FollowMyHealth portal, you will also be able to view your health information using other applications (apps) compatible with our system.

## 2025-02-21 NOTE — DISCHARGE NOTE OB - PROVIDER TOKENS
FREE:[LAST:[[facility]],PHONE:[phone]],FAX:[(   )    -],ADDRESS:[[address] [place] [address]],FOLLOWUP:[1 month]]

## 2025-02-21 NOTE — DISCHARGE NOTE OB - CARE PLAN
1 Principal Discharge DX:	 (normal spontaneous vaginal delivery)  Assessment and plan of treatment:	After discharge, please stay on pelvic rest for 6 weeks, meaning no sexual intercourse, no tampons and no douching.  No driving for 2 weeks as women can loose a lot of blood during delivery and there is a possibility of being lightheaded/fainting.  No lifting objects heavier than baby for two weeks.  Expect to have vaginal bleeding/spotting for up to six weeks.  The bleeding should get lighter and more white/light brown with time.  For bleeding soaking more than a pad an hour or passing clots greater than the size of your fist, come in to the emergency department.    Follow up in clinic in 6 weeks.

## 2025-02-21 NOTE — DISCHARGE NOTE OB - CARE PROVIDER_API CALL
[facility],   [address] [place] [address]  Phone: [phone]  Fax: (   )    -  Follow Up Time: 1 month

## 2025-02-21 NOTE — PROGRESS NOTE ADULT - ATTENDING COMMENTS
Obstetrical  8am-6pm:  Patient seen and examined by me.  Agree with above resident note. Doing well without complaints.  Elana CAMARGO
pt seen and examined  agree with resident exam  f/o [facility]

## 2025-02-21 NOTE — PROGRESS NOTE ADULT - SUBJECTIVE AND OBJECTIVE BOX
23yo PPD#2 s/p . Patient feels well. Endorsing congestion and dry nose overnight. Pain is well controlled, tolerating regular diet, passing flatus, voiding spontaneously, ambulating without difficulty. Denies heavy vaginal bleeding, CP/SOB, lightheadedness/dizziness, N/V.    O:  Vitals:   Vital Signs Last 24 Hrs  T(C): 36.6 (2025 05:35), Max: 37.1 (2025 09:55)  T(F): 97.8 (2025 05:35), Max: 98.7 (2025 09:55)  HR: 92 (2025 05:35) (64 - 96)  BP: 113/67 (2025 05:35) (108/74 - 115/78)  BP(mean): --  RR: 18 (2025 05:35) (18 - 18)  SpO2: 96% (2025 05:35) (96% - 96%)    Parameters below as of 2025 05:35  Patient On (Oxygen Delivery Method): room air        MEDICATIONS  (STANDING):  diphtheria/tetanus/pertussis (acellular) Vaccine (Adacel) 0.5 milliLiter(s) IntraMuscular once  ibuprofen  Tablet. 600 milliGRAM(s) Oral every 6 hours  influenza   Vaccine 0.5 milliLiter(s) IntraMuscular once  oxytocin Infusion 333 milliUNIT(s)/Min (333 mL/Hr) IV Continuous <Continuous>  oxytocin Infusion 167 milliUNIT(s)/Min (167 mL/Hr) IV Continuous <Continuous>  oxytocin Infusion. 2 milliUNIT(s)/Min (2 mL/Hr) IV Continuous <Continuous>  prenatal multivitamin 1 Tablet(s) Oral daily  sodium chloride 0.9% lock flush 3 milliLiter(s) IV Push every 8 hours      MEDICATIONS  (PRN):  benzocaine 20%/menthol 0.5% Spray 1 Spray(s) Topical every 6 hours PRN for Perineal discomfort  dibucaine 1% Ointment 1 Application(s) Topical every 6 hours PRN Perineal discomfort  diphenhydrAMINE 25 milliGRAM(s) Oral every 6 hours PRN Pruritus  guaiFENesin Oral Liquid (Sugar-Free) 100 milliGRAM(s) Oral every 6 hours PRN Cough  hydrocortisone 1% Cream 1 Application(s) Topical every 6 hours PRN Moderate Pain (4-6)  lanolin Ointment 1 Application(s) Topical every 6 hours PRN nipple soreness  magnesium hydroxide Suspension 30 milliLiter(s) Oral two times a day PRN Constipation  oxyCODONE    IR 5 milliGRAM(s) Oral every 3 hours PRN Moderate to Severe Pain (4-10)  oxyCODONE    IR 5 milliGRAM(s) Oral once PRN Moderate to Severe Pain (4-10)  pramoxine 1%/zinc 5% Cream 1 Application(s) Topical every 4 hours PRN Moderate Pain (4-6)  simethicone 80 milliGRAM(s) Chew every 4 hours PRN Gas  witch hazel Pads 1 Application(s) Topical every 4 hours PRN Perineal discomfort        Labs:  Blood type: O Positive  Rubella IgG: RPR: Negative                          11.6   5.79 >-----------< 220    (  @ 20:33 )             35.1    25 @ 06:52      138  |  105  |  10  ----------------------------<  78  4.0   |  22  |  0.72    25 @ 20:33      139  |  103  |  9   ----------------------------<  82  4.1   |  21[L]  |  0.86        Ca    8.9      2025 06:52  Ca    9.4      2025 20:33    TPro  5.4[L]  /  Alb  2.5[L]  /  TBili  0.3  /  DBili  x   /  AST  108[H]  /  ALT  145[H]  /  AlkPhos  219[H]  25 @ 06:52  TPro  7.2  /  Alb  3.4  /  TBili  0.6  /  DBili  x   /  AST  154[H]  /  ALT  199[H]  /  AlkPhos  322[H]  25 @ 20:33          Physical Exam:  General: NAD  Abdomen: soft, non-tender, non-distended, fundus firm  Vaginal: No heavy vaginal bleeding  Extremities: No erythema/edema

## 2025-02-21 NOTE — PROGRESS NOTE ADULT - ASSESSMENT
25yo PPD#1 s/p .  Patient is stable and doing well post-partum.     - Pain well controlled, continue current pain regimen  - Increase ambulation, SCDs when not ambulating  - Continue regular diet  - Pt would like to discuss IUD at postpartum follow up   - Pt to f/u with bibiana Burroughs, PGY-1
23yo PPD#2 s/p .  Patient is stable and doing well post-partum.      - Pain well controlled, continue current pain regimen  - Increase ambulation, SCDs when not ambulating  - Continue regular diet  - Discharge planning   - Pt to return to [facility] for postpartum care and would like to discuss birth control at that time    Mayda Burroughs, PGY-1

## 2025-02-21 NOTE — DISCHARGE NOTE OB - MEDICATION SUMMARY - MEDICATIONS TO TAKE
I will START or STAY ON the medications listed below when I get home from the hospital:    ibuprofen 600 mg oral tablet  -- 1 tab(s) by mouth every 6 hours  -- Indication: For Pain    lanolin topical ointment  -- 1 Apply on skin to affected area every 6 hours As needed nipple soreness  -- Indication: For Nipple Pain    witch hazel 50% topical pad  -- 1 Apply on skin to affected area every 4 hours As needed Perineal discomfort  -- Indication: For Perineal Pain    Prenatal Multivitamins with Folic Acid 1 mg oral tablet  -- 1 tab(s) by mouth once a day  -- Indication: For Vitamin

## 2025-02-22 LAB
RUBV IGG SER-ACNC: 4.22 INDEX — SIGNIFICANT CHANGE UP
RUBV IGG SER-IMP: POSITIVE — SIGNIFICANT CHANGE UP